# Patient Record
Sex: MALE | Race: WHITE | NOT HISPANIC OR LATINO | Employment: OTHER | URBAN - METROPOLITAN AREA
[De-identification: names, ages, dates, MRNs, and addresses within clinical notes are randomized per-mention and may not be internally consistent; named-entity substitution may affect disease eponyms.]

---

## 2023-10-05 ENCOUNTER — TELEPHONE (OUTPATIENT)
Dept: CARDIOLOGY | Facility: CLINIC | Age: 83
End: 2023-10-05
Payer: MEDICARE

## 2023-10-26 ENCOUNTER — HOSPITAL ENCOUNTER (OUTPATIENT)
Dept: CARDIOLOGY | Facility: HOSPITAL | Age: 83
Discharge: HOME | End: 2023-10-26
Payer: MEDICARE

## 2023-10-26 DIAGNOSIS — I35.0 NONRHEUMATIC AORTIC (VALVE) STENOSIS: ICD-10-CM

## 2023-10-26 LAB — EJECTION FRACTION APICAL 4 CHAMBER: 47.6

## 2023-10-26 PROCEDURE — 93306 TTE W/DOPPLER COMPLETE: CPT

## 2023-10-26 PROCEDURE — 93306 TTE W/DOPPLER COMPLETE: CPT | Performed by: INTERNAL MEDICINE

## 2023-11-03 NOTE — PROGRESS NOTES
"Counseling:  The patient was counseled regarding diagnostic results, instructions for management, risk factor reductions, prognosis, patient and family education, impressions, risks and benefits of treatment options and importance of compliance with treatment.      Chief Complaint:   The patient presents today for 10-month followup of CAD, HTN and aortic stenosis s/p echocardiogram.     History Of Present Illness:    Carlito Rivas is an 83 year old male patient who presents today in the company of his wife for 10-month followup of CAD, HTN and aortic stenosis s/p echocardiogram. His PMH is significant for aortic stenosis, CAD s/p PCI to RCA, hyperlipidemia, HTN, and JASWINDER. Echocardiogram performed 10/26/2023 demonstrated an EF of LVEF 50-55% and moderate aortic stenosis. Since last being seen, the patient states that he has done well from a cardiac standpoint. He denies any CP, chest discomfort or SOB. EKG today is stable with no acute changes. Per the patient's wife, he is being worked up for cataract surgery, and she inquired if this was safe to proceed with from a cardiac standpoint. The patient is compliant with his prescribed medications.      Last Recorded Vitals:  Vitals:    11/06/23 1118   BP: 102/64   Pulse: 74   Weight: 98.4 kg (217 lb)   Height: 1.778 m (5' 10\")       Past Surgical History:  He has a past surgical history that includes Adenoidectomy (10/12/2016); Tonsillectomy (10/12/2016); Total hip arthroplasty (05/23/2018); Other surgical history (05/23/2018); Coronary angioplasty (04/19/2021); and Cataract extraction (04/12/2017).      Social History:  He reports that he has never smoked. He has never used smokeless tobacco. He reports that he does not drink alcohol and does not use drugs.    Family History:  Family History   Problem Relation Name Age of Onset    Other (cardiovascular disease) Mother      Coronary artery disease Father      Heart attack Father          Allergies:  Patient has no known " allergies.    Outpatient Medications:  Current Outpatient Medications   Medication Instructions    acetaminophen (TYLENOL) 325 mg, oral, Every 6 hours PRN    alogliptin (Nesina) 12.5 mg tablet 1 tablet, oral, Daily    alogliptin (NESINA) 25 mg, oral, Daily RT    amLODIPine (Norvasc) 10 mg tablet 1 tablet, oral, Daily    aspirin 81 mg EC tablet 1 tablet, oral, Daily    blood sugar diagnostic (Accu-Chek Concetta Plus test strp) strip Test blood sugar two times a day, as instructed, on insulin, DM 2, Dx E11.65    cholecalciferol (Vitamin D-3) 50 MCG (2000 UT) tablet 1 tablet, oral, Daily    cholecalciferol (VITAMIN D-3) 3,000 Units, oral, Daily RT    clopidogrel (PLAVIX) 75 mg, oral, Daily    diclofenac sodium 2 g, Topical, 2 times daily    empagliflozin (Jardiance) 25 mg 0.5 tablets, oral, Daily    ibuprofen 84 mg, oral, Daily RT    insulin asp prt-insulin aspart (NovoLOG Mix 70-30 U-100 Insuln) 100 unit/mL (70-30) injection Inject 25 Units under the skin once daily (M-F).    insulin NPH, Isophane, (NovoLIN N NPH U-100 Insulin) 100 unit/mL injection INJECT 40 UNITS SUBCUTANEOUSLY IN THE MORNING AND INJECT 20 UNITS IN THE EVENING (DISCARD VIAL 28 DAYS AFTER FIRST USE)    lisinopril 40 mg tablet 1 tablet, oral, Daily    metoprolol tartrate (LOPRESSOR) 50 mg, oral, 2 times daily    quinapril (ACCUPRIL) 40 mg, oral, Daily RT    rosuvastatin (Crestor) 40 mg tablet 1 tablet, oral, Daily    SITagliptin phosphate (Januvia) 100 mg tablet 1 tablet, oral, Daily       Review of Systems   All other systems reviewed and are negative.     Physical Exam:  Constitutional:       Appearance: Healthy appearance. Not in distress.   Neck:      Vascular: No JVR. JVD normal.   Pulmonary:      Effort: Pulmonary effort is normal.      Breath sounds: Normal breath sounds. No wheezing. No rhonchi. No rales.   Chest:      Chest wall: Not tender to palpatation.   Cardiovascular:      PMI at left midclavicular line. Normal rate. Regular rhythm. Normal  S1. Normal S2.       Murmurs: There is no murmur.      No gallop.  No click. No rub.   Pulses:     Intact distal pulses.   Edema:     Peripheral edema absent.   Abdominal:      General: Bowel sounds are normal.      Palpations: Abdomen is soft.      Tenderness: There is no abdominal tenderness.   Musculoskeletal: Normal range of motion.         General: No tenderness. Skin:     General: Skin is warm and dry.   Neurological:      General: No focal deficit present.      Mental Status: Alert and oriented to person, place and time.        Last Labs:  CBC -  Lab Results   Component Value Date    WBC 5.6 12/19/2022    HGB 12.1 (L) 12/19/2022    HCT 38.0 (L) 12/19/2022    MCV 92 12/19/2022     (L) 12/19/2022       CMP -  Lab Results   Component Value Date    CALCIUM 8.4 (L) 12/19/2022    PROT 6.9 12/18/2022    ALBUMIN 4.1 12/18/2022    AST 17 12/18/2022    ALT 12 12/18/2022    ALKPHOS 71 12/18/2022    BILITOT 0.5 12/18/2022       LIPID PANEL -   Lab Results   Component Value Date    CHOL 121 10/28/2019    TRIG 115 10/28/2019    HDL 34.7 (A) 10/28/2019    CHHDL 3.5 10/28/2019    LDLF 63 10/28/2019    VLDL 23 10/28/2019       RENAL FUNCTION PANEL -   Lab Results   Component Value Date    GLUCOSE 115 (H) 12/19/2022     12/19/2022    K 4.3 12/19/2022     (H) 12/19/2022    CO2 22 12/19/2022    ANIONGAP 12 12/19/2022    BUN 19 12/19/2022    CREATININE 1.07 12/19/2022    GFRMALE 70 12/19/2022    CALCIUM 8.4 (L) 12/19/2022    ALBUMIN 4.1 12/18/2022        Lab Results   Component Value Date    HGBA1C 11.8 (H) 07/28/2022       Last Cardiology Tests:  10/26/2023 - TTE  1. Left ventricular systolic function is low normal with a 50-55% estimated ejection fraction.  2. Basal and mid inferior wall and basal and mid inferolateral wall are abnormal.  3. Spectral Doppler shows an impaired relaxation pattern of left ventricular diastolic filling.  4. Moderate aortic valve stenosis.    12/19/2022 - TTE  1. Left ventricular  systolic function is normal with a 60% estimated ejection fraction.  2. Spectral Doppler shows an impaired relaxation pattern of left ventricular diastolic filling.  3. Mild to moderate aortic valve stenosis.    05/06/2021 - TTE  1. The left ventricular systolic function is normal.  2. Mild aortic valve stenosis.     Diagnostic review: I have personally reviewed the result(s) of the Echocardiogram.    Assessment/Plan   1) CAD s/p PCI of RCA in past   Stable - Angina free  On metoprolol tartrate 50 mg BID, rosuvastatin 40 mg daily, ASA  TTE 10/26/2023 with LVEF 50-55%  Doing well - denies CP, chest discomfort or SOB  No contraindications to cataract surgery  Check CBC, CMP, Lipid Panel  Repeat echo 1 year      2) Mild AS by Echo  TTE 10/26/2023 with moderate aortic stenosis  Denies SOB  Repeat echo 1 year     3) HTN  Stable  On amlodipine 10 mg daily, metoprolol tartrate 50 mg BID, lisinopril 40 mg daily   Check CMP         Scribe Attestation  By signing my name below, I, Kristi Garrison   attest that this documentation has been prepared under the direction and in the presence of Efren Pandey MD.

## 2023-11-04 PROBLEM — R06.09 DOE (DYSPNEA ON EXERTION): Status: ACTIVE | Noted: 2023-11-04

## 2023-11-04 PROBLEM — E78.5 HYPERLIPIDEMIA: Status: ACTIVE | Noted: 2023-11-04

## 2023-11-04 PROBLEM — E11.21: Status: ACTIVE | Noted: 2019-01-17

## 2023-11-04 PROBLEM — R97.20 ELEVATED PSA: Status: ACTIVE | Noted: 2023-11-04

## 2023-11-04 PROBLEM — B35.1 ONYCHOMYCOSIS: Status: ACTIVE | Noted: 2023-11-04

## 2023-11-04 PROBLEM — F03.90 DEMENTIA (MULTI): Status: ACTIVE | Noted: 2022-07-28

## 2023-11-04 PROBLEM — I25.9 MYOCARDIAL ISCHEMIA: Status: ACTIVE | Noted: 2023-11-04

## 2023-11-04 PROBLEM — E11.40 TYPE 2 DIABETES MELLITUS WITH DIABETIC NEUROPATHY, UNSPECIFIED (MULTI): Status: ACTIVE | Noted: 2023-11-04

## 2023-11-04 PROBLEM — R41.9 NEUROCOGNITIVE DISORDER: Status: ACTIVE | Noted: 2023-11-04

## 2023-11-04 PROBLEM — G47.33 OBSTRUCTIVE SLEEP APNEA, ADULT: Status: ACTIVE | Noted: 2023-11-04

## 2023-11-04 PROBLEM — M19.90 OSTEOARTHRITIS: Status: ACTIVE | Noted: 2023-11-04

## 2023-11-04 PROBLEM — I25.10 ARTERIOSCLEROSIS OF CORONARY ARTERY: Status: ACTIVE | Noted: 2023-11-04

## 2023-11-04 PROBLEM — I10 HYPERTENSION: Status: ACTIVE | Noted: 2023-11-04

## 2023-11-04 PROBLEM — R25.1 TREMOR: Status: ACTIVE | Noted: 2023-11-04

## 2023-11-04 PROBLEM — I87.2 VENOUS INSUFFICIENCY OF LEG: Status: ACTIVE | Noted: 2023-11-04

## 2023-11-04 PROBLEM — H26.9 BILATERAL CATARACTS: Status: ACTIVE | Noted: 2023-11-04

## 2023-11-04 PROBLEM — I35.0 AORTIC STENOSIS: Status: ACTIVE | Noted: 2022-07-28

## 2023-11-04 PROBLEM — E55.9 VITAMIN D DEFICIENCY: Status: ACTIVE | Noted: 2019-07-30

## 2023-11-04 PROBLEM — E66.9 OBESITY, CLASS I, BMI 30-34.9: Status: ACTIVE | Noted: 2019-01-17

## 2023-11-04 PROBLEM — L97.909 VENOUS STASIS ULCER OF LOWER LEG (MULTI): Status: ACTIVE | Noted: 2023-11-04

## 2023-11-04 PROBLEM — I83.008 VENOUS STASIS ULCER OF LOWER LEG (MULTI): Status: ACTIVE | Noted: 2023-11-04

## 2023-11-04 RX ORDER — METOPROLOL TARTRATE 50 MG/1
50 TABLET ORAL 2 TIMES DAILY
COMMUNITY

## 2023-11-04 RX ORDER — ACETAMINOPHEN 325 MG/1
325 TABLET ORAL EVERY 6 HOURS PRN
COMMUNITY
Start: 2022-07-28

## 2023-11-04 RX ORDER — BLOOD SUGAR DIAGNOSTIC
STRIP MISCELLANEOUS
COMMUNITY
Start: 2021-01-26

## 2023-11-04 RX ORDER — ALOGLIPTIN 12.5 MG/1
1 TABLET, FILM COATED ORAL DAILY
COMMUNITY
End: 2023-11-06 | Stop reason: WASHOUT

## 2023-11-04 RX ORDER — QUINAPRIL 40 MG/1
40 TABLET ORAL
COMMUNITY
Start: 2022-07-28 | End: 2023-11-06 | Stop reason: WASHOUT

## 2023-11-04 RX ORDER — LISINOPRIL 40 MG/1
1 TABLET ORAL DAILY
COMMUNITY
Start: 2023-03-20

## 2023-11-04 RX ORDER — ROSUVASTATIN CALCIUM 40 MG/1
1 TABLET, COATED ORAL DAILY
COMMUNITY
Start: 2023-03-20

## 2023-11-04 RX ORDER — HUMAN INSULIN 100 [IU]/ML
INJECTION, SUSPENSION SUBCUTANEOUS
COMMUNITY
Start: 2023-02-15 | End: 2023-11-06 | Stop reason: WASHOUT

## 2023-11-04 RX ORDER — CLOPIDOGREL BISULFATE 75 MG/1
75 TABLET ORAL DAILY
COMMUNITY
End: 2023-11-06 | Stop reason: WASHOUT

## 2023-11-04 RX ORDER — AMLODIPINE BESYLATE 10 MG/1
1 TABLET ORAL DAILY
COMMUNITY
Start: 2015-04-15

## 2023-11-04 RX ORDER — CHOLECALCIFEROL (VITAMIN D3) 25 MCG
3000 TABLET ORAL
COMMUNITY
Start: 2016-10-12

## 2023-11-04 RX ORDER — IBUPROFEN 200 MG
84 TABLET ORAL
COMMUNITY

## 2023-11-04 RX ORDER — ASPIRIN 81 MG/1
1 TABLET ORAL DAILY
COMMUNITY
Start: 2016-10-12

## 2023-11-04 RX ORDER — INSULIN ASPART 100 [IU]/ML
25 INJECTION, SUSPENSION SUBCUTANEOUS
COMMUNITY

## 2023-11-04 RX ORDER — ALOGLIPTIN 25 MG/1
25 TABLET, FILM COATED ORAL
COMMUNITY
Start: 2022-07-28 | End: 2023-11-06 | Stop reason: WASHOUT

## 2023-11-04 RX ORDER — CHOLECALCIFEROL (VITAMIN D3) 50 MCG
1 TABLET ORAL DAILY
COMMUNITY
Start: 2023-03-20 | End: 2023-11-06 | Stop reason: WASHOUT

## 2023-11-06 ENCOUNTER — OFFICE VISIT (OUTPATIENT)
Dept: CARDIOLOGY | Facility: CLINIC | Age: 83
End: 2023-11-06
Payer: MEDICARE

## 2023-11-06 VITALS
HEART RATE: 74 BPM | HEIGHT: 70 IN | DIASTOLIC BLOOD PRESSURE: 64 MMHG | SYSTOLIC BLOOD PRESSURE: 102 MMHG | WEIGHT: 217 LBS | BODY MASS INDEX: 31.07 KG/M2

## 2023-11-06 DIAGNOSIS — I35.0 NONRHEUMATIC AORTIC VALVE STENOSIS: Primary | ICD-10-CM

## 2023-11-06 DIAGNOSIS — I25.10 ARTERIOSCLEROSIS OF CORONARY ARTERY: ICD-10-CM

## 2023-11-06 DIAGNOSIS — I10 PRIMARY HYPERTENSION: ICD-10-CM

## 2023-11-06 PROCEDURE — 1160F RVW MEDS BY RX/DR IN RCRD: CPT | Performed by: INTERNAL MEDICINE

## 2023-11-06 PROCEDURE — 3074F SYST BP LT 130 MM HG: CPT | Performed by: INTERNAL MEDICINE

## 2023-11-06 PROCEDURE — 3078F DIAST BP <80 MM HG: CPT | Performed by: INTERNAL MEDICINE

## 2023-11-06 PROCEDURE — 99213 OFFICE O/P EST LOW 20 MIN: CPT | Performed by: INTERNAL MEDICINE

## 2023-11-06 PROCEDURE — 1159F MED LIST DOCD IN RCRD: CPT | Performed by: INTERNAL MEDICINE

## 2023-11-06 PROCEDURE — 93000 ELECTROCARDIOGRAM COMPLETE: CPT | Performed by: INTERNAL MEDICINE

## 2023-11-06 PROCEDURE — 1036F TOBACCO NON-USER: CPT | Performed by: INTERNAL MEDICINE

## 2023-11-06 ASSESSMENT — ENCOUNTER SYMPTOMS
LOSS OF SENSATION IN FEET: 0
OCCASIONAL FEELINGS OF UNSTEADINESS: 1
DEPRESSION: 0

## 2023-11-06 NOTE — PATIENT INSTRUCTIONS
Continue all current medications as prescribed.   Please have blood work drawn at your earliest convenience.   Repeat echocardiogram (ultrasound of the heart) in 1 year to followup on your heart function and structure.   No contraindications to having cataract surgery.   Followup with Dr. Pandey in 1 year, sooner should any issues or concerns arise before then.     If you have any questions or cardiac concerns, please call our office at [phone].

## 2023-11-06 NOTE — LETTER
"November 6, 2023     Carlito Whitmore MD  [address]  [facility] [address] [address]    Patient: Carlito Rivas   YOB: 1940   Date of Visit: 11/6/2023       Dear Dr. Carlito Whitmore MD:    Thank you for referring Carlito Rivas to me for evaluation. Below are my notes for this consultation.  If you have questions, please do not hesitate to call me. I look forward to following your patient along with you.       Sincerely,     Efren Pandey MD      CC: No Recipients  ______________________________________________________________________________________    Counseling:  The patient was counseled regarding diagnostic results, instructions for management, risk factor reductions, prognosis, patient and family education, impressions, risks and benefits of treatment options and importance of compliance with treatment.      Chief Complaint:   The patient presents today for 10-month followup of CAD, HTN and aortic stenosis s/p echocardiogram.     History Of Present Illness:    Carlito Rivas is an 83 year old male patient who presents today in the company of his wife for 10-month followup of CAD, HTN and aortic stenosis s/p echocardiogram. His PMH is significant for aortic stenosis, CAD s/p PCI to RCA, hyperlipidemia, HTN, and JASWINDER. Echocardiogram performed 10/26/2023 demonstrated an EF of LVEF 50-55% and moderate aortic stenosis. Since last being seen, the patient states that he has done well from a cardiac standpoint. He denies any CP, chest discomfort or SOB. EKG today is stable with no acute changes. Per the patient's wife, he is being worked up for cataract surgery, and she inquired if this was safe to proceed with from a cardiac standpoint. The patient is compliant with his prescribed medications.      Last Recorded Vitals:  Vitals:    11/06/23 1118   BP: 102/64   Pulse: 74   Weight: 98.4 kg (217 lb)   Height: 1.778 m (5' 10\")       Past Surgical History:  He " has a past surgical history that includes Adenoidectomy (10/12/2016); Tonsillectomy (10/12/2016); Total hip arthroplasty (05/23/2018); Other surgical history (05/23/2018); Coronary angioplasty (04/19/2021); and Cataract extraction (04/12/2017).      Social History:  He reports that he has never smoked. He has never used smokeless tobacco. He reports that he does not drink alcohol and does not use drugs.    Family History:  Family History   Problem Relation Name Age of Onset   • Other (cardiovascular disease) Mother     • Coronary artery disease Father     • Heart attack Father          Allergies:  Patient has no known allergies.    Outpatient Medications:  Current Outpatient Medications   Medication Instructions   • acetaminophen (TYLENOL) 325 mg, oral, Every 6 hours PRN   • alogliptin (Nesina) 12.5 mg tablet 1 tablet, oral, Daily   • alogliptin (NESINA) 25 mg, oral, Daily RT   • amLODIPine (Norvasc) 10 mg tablet 1 tablet, oral, Daily   • aspirin 81 mg EC tablet 1 tablet, oral, Daily   • blood sugar diagnostic (Accu-Chek Concetta Plus test strp) strip Test blood sugar two times a day, as instructed, on insulin, DM 2, Dx E11.65   • cholecalciferol (Vitamin D-3) 50 MCG (2000 UT) tablet 1 tablet, oral, Daily   • cholecalciferol (VITAMIN D-3) 3,000 Units, oral, Daily RT   • clopidogrel (PLAVIX) 75 mg, oral, Daily   • diclofenac sodium 2 g, Topical, 2 times daily   • empagliflozin (Jardiance) 25 mg 0.5 tablets, oral, Daily   • ibuprofen 84 mg, oral, Daily RT   • insulin asp prt-insulin aspart (NovoLOG Mix 70-30 U-100 Insuln) 100 unit/mL (70-30) injection Inject 25 Units under the skin once daily (M-F).   • insulin NPH, Isophane, (NovoLIN N NPH U-100 Insulin) 100 unit/mL injection INJECT 40 UNITS SUBCUTANEOUSLY IN THE MORNING AND INJECT 20 UNITS IN THE EVENING (DISCARD VIAL 28 DAYS AFTER FIRST USE)   • lisinopril 40 mg tablet 1 tablet, oral, Daily   • metoprolol tartrate (LOPRESSOR) 50 mg, oral, 2 times daily   • quinapril  (ACCUPRIL) 40 mg, oral, Daily RT   • rosuvastatin (Crestor) 40 mg tablet 1 tablet, oral, Daily   • SITagliptin phosphate (Januvia) 100 mg tablet 1 tablet, oral, Daily       Review of Systems   All other systems reviewed and are negative.     Physical Exam:  Constitutional:       Appearance: Healthy appearance. Not in distress.   Neck:      Vascular: No JVR. JVD normal.   Pulmonary:      Effort: Pulmonary effort is normal.      Breath sounds: Normal breath sounds. No wheezing. No rhonchi. No rales.   Chest:      Chest wall: Not tender to palpatation.   Cardiovascular:      PMI at left midclavicular line. Normal rate. Regular rhythm. Normal S1. Normal S2.       Murmurs: There is no murmur.      No gallop.  No click. No rub.   Pulses:     Intact distal pulses.   Edema:     Peripheral edema absent.   Abdominal:      General: Bowel sounds are normal.      Palpations: Abdomen is soft.      Tenderness: There is no abdominal tenderness.   Musculoskeletal: Normal range of motion.         General: No tenderness. Skin:     General: Skin is warm and dry.   Neurological:      General: No focal deficit present.      Mental Status: Alert and oriented to person, place and time.        Last Labs:  CBC -  Lab Results   Component Value Date    WBC 5.6 12/19/2022    HGB 12.1 (L) 12/19/2022    HCT 38.0 (L) 12/19/2022    MCV 92 12/19/2022     (L) 12/19/2022       CMP -  Lab Results   Component Value Date    CALCIUM 8.4 (L) 12/19/2022    PROT 6.9 12/18/2022    ALBUMIN 4.1 12/18/2022    AST 17 12/18/2022    ALT 12 12/18/2022    ALKPHOS 71 12/18/2022    BILITOT 0.5 12/18/2022       LIPID PANEL -   Lab Results   Component Value Date    CHOL 121 10/28/2019    TRIG 115 10/28/2019    HDL 34.7 (A) 10/28/2019    CHHDL 3.5 10/28/2019    LDLF 63 10/28/2019    VLDL 23 10/28/2019       RENAL FUNCTION PANEL -   Lab Results   Component Value Date    GLUCOSE 115 (H) 12/19/2022     12/19/2022    K 4.3 12/19/2022     (H) 12/19/2022     CO2 22 12/19/2022    ANIONGAP 12 12/19/2022    BUN 19 12/19/2022    CREATININE 1.07 12/19/2022    GFRMALE 70 12/19/2022    CALCIUM 8.4 (L) 12/19/2022    ALBUMIN 4.1 12/18/2022        Lab Results   Component Value Date    HGBA1C 11.8 (H) 07/28/2022       Last Cardiology Tests:  10/26/2023 - TTE  1. Left ventricular systolic function is low normal with a 50-55% estimated ejection fraction.  2. Basal and mid inferior wall and basal and mid inferolateral wall are abnormal.  3. Spectral Doppler shows an impaired relaxation pattern of left ventricular diastolic filling.  4. Moderate aortic valve stenosis.    12/19/2022 - TTE  1. Left ventricular systolic function is normal with a 60% estimated ejection fraction.  2. Spectral Doppler shows an impaired relaxation pattern of left ventricular diastolic filling.  3. Mild to moderate aortic valve stenosis.    05/06/2021 - TTE  1. The left ventricular systolic function is normal.  2. Mild aortic valve stenosis.     Diagnostic review: I have personally reviewed the result(s) of the Echocardiogram.    Assessment/Plan  1) CAD s/p PCI of RCA in past   Stable - Angina free  On metoprolol tartrate 50 mg BID, rosuvastatin 40 mg daily, ASA  TTE 10/26/2023 with LVEF 50-55%  Doing well - denies CP, chest discomfort or SOB  No contraindications to cataract surgery  Check CBC, CMP, Lipid Panel  Repeat echo 1 year      2) Mild AS by Echo  TTE 10/26/2023 with moderate aortic stenosis  Denies SOB  Repeat echo 1 year     3) HTN  Stable  On amlodipine 10 mg daily, metoprolol tartrate 50 mg BID, lisinopril 40 mg daily   Check CMP         Scribe Attestation  By signing my name below, I, Kristi Garrison   attest that this documentation has been prepared under the direction and in the presence of Efren Pandey MD.

## 2024-04-15 ENCOUNTER — APPOINTMENT (OUTPATIENT)
Dept: RADIOLOGY | Facility: HOSPITAL | Age: 84
End: 2024-04-15
Payer: OTHER GOVERNMENT

## 2024-04-15 ENCOUNTER — HOSPITAL ENCOUNTER (EMERGENCY)
Facility: HOSPITAL | Age: 84
Discharge: OTHER INSTITUTION | End: 2024-04-15
Attending: STUDENT IN AN ORGANIZED HEALTH CARE EDUCATION/TRAINING PROGRAM
Payer: OTHER GOVERNMENT

## 2024-04-15 ENCOUNTER — APPOINTMENT (OUTPATIENT)
Dept: CARDIOLOGY | Facility: HOSPITAL | Age: 84
End: 2024-04-15
Payer: OTHER GOVERNMENT

## 2024-04-15 VITALS
DIASTOLIC BLOOD PRESSURE: 85 MMHG | RESPIRATION RATE: 18 BRPM | HEART RATE: 83 BPM | HEIGHT: 70 IN | SYSTOLIC BLOOD PRESSURE: 178 MMHG | OXYGEN SATURATION: 92 % | BODY MASS INDEX: 27.55 KG/M2 | TEMPERATURE: 97.3 F | WEIGHT: 192.46 LBS

## 2024-04-15 DIAGNOSIS — I48.91 ATRIAL FIBRILLATION WITH RAPID VENTRICULAR RESPONSE (MULTI): ICD-10-CM

## 2024-04-15 DIAGNOSIS — N10 ACUTE PYELONEPHRITIS: Primary | ICD-10-CM

## 2024-04-15 DIAGNOSIS — N20.0 KIDNEY STONE: ICD-10-CM

## 2024-04-15 DIAGNOSIS — Z96.0 RETAINED URETERAL STENT: ICD-10-CM

## 2024-04-15 DIAGNOSIS — N17.9 ACUTE KIDNEY INJURY (CMS-HCC): ICD-10-CM

## 2024-04-15 DIAGNOSIS — E86.0 DEHYDRATION: ICD-10-CM

## 2024-04-15 LAB
ABO GROUP (TYPE) IN BLOOD: NORMAL
ALBUMIN SERPL BCP-MCNC: 3.2 G/DL (ref 3.4–5)
ALP SERPL-CCNC: 69 U/L (ref 33–136)
ALT SERPL W P-5'-P-CCNC: 20 U/L (ref 10–52)
ANION GAP SERPL CALC-SCNC: 12 MMOL/L (ref 10–20)
ANTIBODY SCREEN: NORMAL
APPEARANCE UR: ABNORMAL
AST SERPL W P-5'-P-CCNC: 22 U/L (ref 9–39)
BACTERIA #/AREA URNS AUTO: ABNORMAL /HPF
BASOPHILS # BLD AUTO: 0.04 X10*3/UL (ref 0–0.1)
BASOPHILS NFR BLD AUTO: 0.3 %
BILIRUB SERPL-MCNC: 0.5 MG/DL (ref 0–1.2)
BILIRUB UR STRIP.AUTO-MCNC: NEGATIVE MG/DL
BNP SERPL-MCNC: 120 PG/ML (ref 0–99)
BUN SERPL-MCNC: 36 MG/DL (ref 6–23)
CALCIUM SERPL-MCNC: 9 MG/DL (ref 8.6–10.3)
CARDIAC TROPONIN I PNL SERPL HS: 23 NG/L (ref 0–20)
CARDIAC TROPONIN I PNL SERPL HS: 26 NG/L (ref 0–20)
CHLORIDE SERPL-SCNC: 113 MMOL/L (ref 98–107)
CO2 SERPL-SCNC: 22 MMOL/L (ref 21–32)
COLOR UR: ABNORMAL
CREAT SERPL-MCNC: 1.97 MG/DL (ref 0.5–1.3)
EGFRCR SERPLBLD CKD-EPI 2021: 33 ML/MIN/1.73M*2
EOSINOPHIL # BLD AUTO: 0.13 X10*3/UL (ref 0–0.4)
EOSINOPHIL NFR BLD AUTO: 0.9 %
ERYTHROCYTE [DISTWIDTH] IN BLOOD BY AUTOMATED COUNT: 14.5 % (ref 11.5–14.5)
GLUCOSE SERPL-MCNC: 88 MG/DL (ref 74–99)
GLUCOSE UR STRIP.AUTO-MCNC: ABNORMAL MG/DL
HCT VFR BLD AUTO: 43.6 % (ref 41–52)
HGB BLD-MCNC: 14.2 G/DL (ref 13.5–17.5)
HOLD SPECIMEN: NORMAL
IMM GRANULOCYTES # BLD AUTO: 0.05 X10*3/UL (ref 0–0.5)
IMM GRANULOCYTES NFR BLD AUTO: 0.4 % (ref 0–0.9)
INR PPP: 1.5 (ref 0.9–1.1)
KETONES UR STRIP.AUTO-MCNC: NEGATIVE MG/DL
LACTATE SERPL-SCNC: 1.8 MMOL/L (ref 0.4–2)
LACTATE SERPL-SCNC: 2.1 MMOL/L (ref 0.4–2)
LEUKOCYTE ESTERASE UR QL STRIP.AUTO: ABNORMAL
LYMPHOCYTES # BLD AUTO: 1.94 X10*3/UL (ref 0.8–3)
LYMPHOCYTES NFR BLD AUTO: 13.7 %
MAGNESIUM SERPL-MCNC: 2.29 MG/DL (ref 1.6–2.4)
MCH RBC QN AUTO: 29 PG (ref 26–34)
MCHC RBC AUTO-ENTMCNC: 32.6 G/DL (ref 32–36)
MCV RBC AUTO: 89 FL (ref 80–100)
MONOCYTES # BLD AUTO: 0.98 X10*3/UL (ref 0.05–0.8)
MONOCYTES NFR BLD AUTO: 6.9 %
NEUTROPHILS # BLD AUTO: 10.99 X10*3/UL (ref 1.6–5.5)
NEUTROPHILS NFR BLD AUTO: 77.8 %
NITRITE UR QL STRIP.AUTO: NEGATIVE
NRBC BLD-RTO: 0 /100 WBCS (ref 0–0)
PH UR STRIP.AUTO: 5 [PH]
PLATELET # BLD AUTO: 196 X10*3/UL (ref 150–450)
POTASSIUM SERPL-SCNC: 4 MMOL/L (ref 3.5–5.3)
PROT SERPL-MCNC: 6.1 G/DL (ref 6.4–8.2)
PROT UR STRIP.AUTO-MCNC: ABNORMAL MG/DL
PROTHROMBIN TIME: 16.6 SECONDS (ref 9.8–12.8)
RBC # BLD AUTO: 4.89 X10*6/UL (ref 4.5–5.9)
RBC # UR STRIP.AUTO: ABNORMAL /UL
RBC #/AREA URNS AUTO: >20 /HPF
RH FACTOR (ANTIGEN D): NORMAL
SODIUM SERPL-SCNC: 143 MMOL/L (ref 136–145)
SP GR UR STRIP.AUTO: 1.01
TSH SERPL-ACNC: 2.4 MIU/L (ref 0.44–3.98)
UROBILINOGEN UR STRIP.AUTO-MCNC: <2 MG/DL
WBC # BLD AUTO: 14.1 X10*3/UL (ref 4.4–11.3)
WBC #/AREA URNS AUTO: >50 /HPF
WBC CLUMPS #/AREA URNS AUTO: ABNORMAL /HPF
YEAST BUDDING #/AREA UR COMP ASSIST: PRESENT /HPF

## 2024-04-15 PROCEDURE — 71046 X-RAY EXAM CHEST 2 VIEWS: CPT | Performed by: RADIOLOGY

## 2024-04-15 PROCEDURE — 74176 CT ABD & PELVIS W/O CONTRAST: CPT | Performed by: RADIOLOGY

## 2024-04-15 PROCEDURE — 74176 CT ABD & PELVIS W/O CONTRAST: CPT

## 2024-04-15 PROCEDURE — 80053 COMPREHEN METABOLIC PANEL: CPT | Performed by: STUDENT IN AN ORGANIZED HEALTH CARE EDUCATION/TRAINING PROGRAM

## 2024-04-15 PROCEDURE — 36415 COLL VENOUS BLD VENIPUNCTURE: CPT | Performed by: STUDENT IN AN ORGANIZED HEALTH CARE EDUCATION/TRAINING PROGRAM

## 2024-04-15 PROCEDURE — 71046 X-RAY EXAM CHEST 2 VIEWS: CPT

## 2024-04-15 PROCEDURE — 87086 URINE CULTURE/COLONY COUNT: CPT | Mod: PORLAB | Performed by: STUDENT IN AN ORGANIZED HEALTH CARE EDUCATION/TRAINING PROGRAM

## 2024-04-15 PROCEDURE — 81001 URINALYSIS AUTO W/SCOPE: CPT | Performed by: STUDENT IN AN ORGANIZED HEALTH CARE EDUCATION/TRAINING PROGRAM

## 2024-04-15 PROCEDURE — 85610 PROTHROMBIN TIME: CPT | Performed by: STUDENT IN AN ORGANIZED HEALTH CARE EDUCATION/TRAINING PROGRAM

## 2024-04-15 PROCEDURE — 84484 ASSAY OF TROPONIN QUANT: CPT | Performed by: STUDENT IN AN ORGANIZED HEALTH CARE EDUCATION/TRAINING PROGRAM

## 2024-04-15 PROCEDURE — 99291 CRITICAL CARE FIRST HOUR: CPT | Performed by: STUDENT IN AN ORGANIZED HEALTH CARE EDUCATION/TRAINING PROGRAM

## 2024-04-15 PROCEDURE — 93005 ELECTROCARDIOGRAM TRACING: CPT

## 2024-04-15 PROCEDURE — 85025 COMPLETE CBC W/AUTO DIFF WBC: CPT | Performed by: STUDENT IN AN ORGANIZED HEALTH CARE EDUCATION/TRAINING PROGRAM

## 2024-04-15 PROCEDURE — 83735 ASSAY OF MAGNESIUM: CPT | Performed by: STUDENT IN AN ORGANIZED HEALTH CARE EDUCATION/TRAINING PROGRAM

## 2024-04-15 PROCEDURE — 84443 ASSAY THYROID STIM HORMONE: CPT | Performed by: STUDENT IN AN ORGANIZED HEALTH CARE EDUCATION/TRAINING PROGRAM

## 2024-04-15 PROCEDURE — 2500000004 HC RX 250 GENERAL PHARMACY W/ HCPCS (ALT 636 FOR OP/ED): Performed by: STUDENT IN AN ORGANIZED HEALTH CARE EDUCATION/TRAINING PROGRAM

## 2024-04-15 PROCEDURE — 96365 THER/PROPH/DIAG IV INF INIT: CPT

## 2024-04-15 PROCEDURE — 87040 BLOOD CULTURE FOR BACTERIA: CPT | Mod: PORLAB | Performed by: STUDENT IN AN ORGANIZED HEALTH CARE EDUCATION/TRAINING PROGRAM

## 2024-04-15 PROCEDURE — 99285 EMERGENCY DEPT VISIT HI MDM: CPT | Mod: 25

## 2024-04-15 PROCEDURE — 83880 ASSAY OF NATRIURETIC PEPTIDE: CPT | Performed by: STUDENT IN AN ORGANIZED HEALTH CARE EDUCATION/TRAINING PROGRAM

## 2024-04-15 PROCEDURE — 83605 ASSAY OF LACTIC ACID: CPT | Performed by: STUDENT IN AN ORGANIZED HEALTH CARE EDUCATION/TRAINING PROGRAM

## 2024-04-15 PROCEDURE — 86901 BLOOD TYPING SEROLOGIC RH(D): CPT | Performed by: STUDENT IN AN ORGANIZED HEALTH CARE EDUCATION/TRAINING PROGRAM

## 2024-04-15 RX ORDER — CEFTRIAXONE 1 G/50ML
1 INJECTION, SOLUTION INTRAVENOUS ONCE
Status: COMPLETED | OUTPATIENT
Start: 2024-04-15 | End: 2024-04-15

## 2024-04-15 RX ORDER — INSULIN LISPRO 100 [IU]/ML
0-5 INJECTION, SOLUTION INTRAVENOUS; SUBCUTANEOUS
Status: DISCONTINUED | OUTPATIENT
Start: 2024-04-16 | End: 2024-04-16 | Stop reason: HOSPADM

## 2024-04-15 RX ORDER — DEXTROSE 50 % IN WATER (D50W) INTRAVENOUS SYRINGE
12.5
Status: DISCONTINUED | OUTPATIENT
Start: 2024-04-15 | End: 2024-04-16 | Stop reason: HOSPADM

## 2024-04-15 RX ORDER — DEXTROSE 50 % IN WATER (D50W) INTRAVENOUS SYRINGE
25
Status: DISCONTINUED | OUTPATIENT
Start: 2024-04-15 | End: 2024-04-16 | Stop reason: HOSPADM

## 2024-04-15 RX ADMIN — CEFTRIAXONE SODIUM 1 G: 1 INJECTION, SOLUTION INTRAVENOUS at 12:00

## 2024-04-15 RX ADMIN — SODIUM CHLORIDE 1000 ML: 9 INJECTION, SOLUTION INTRAVENOUS at 09:35

## 2024-04-15 ASSESSMENT — LIFESTYLE VARIABLES
EVER HAD A DRINK FIRST THING IN THE MORNING TO STEADY YOUR NERVES TO GET RID OF A HANGOVER: NO
HAVE YOU EVER FELT YOU SHOULD CUT DOWN ON YOUR DRINKING: NO
EVER FELT BAD OR GUILTY ABOUT YOUR DRINKING: NO
HAVE PEOPLE ANNOYED YOU BY CRITICIZING YOUR DRINKING: NO
TOTAL SCORE: 0

## 2024-04-15 ASSESSMENT — PAIN SCALES - GENERAL: PAINLEVEL_OUTOF10: 0 - NO PAIN

## 2024-04-15 ASSESSMENT — PAIN - FUNCTIONAL ASSESSMENT: PAIN_FUNCTIONAL_ASSESSMENT: 0-10

## 2024-04-15 NOTE — ED PROVIDER NOTES
HPI   Chief Complaint   Patient presents with    Hypotension       HPI: Patient is an 83-year-old male, he has a past medical history of coronary artery disease, hypertension, diabetes, hyperlipidemia, venous insufficiency, chronic kidney disease stage III, he was found to have a UTI with kidney stone and was stented, [facility] for [facility] and tachycardia.  Reports a decrease in appetite over the past week, no increased output, no diarrhea, no vomiting, just reports no taste and nausea when he eats.  He is having a little bit of right flank pain, that is where the stent was placed, he did fall a few days ago and hit the right side, did not hit his head or lose consciousness, he is denying any dysuria or polyuria, no abdominal pain, no chest pain associated with symptoms.      ROS: Complete 12 point review of systems performed, otherwise negative except as noted in the history of present illness    PMH: Reviewed, documented below in note. Pertinents in HPI  PSH: Reviewed and documented below in note. Pertinents in HPI  SH: Lives at home with his wife, no illicits.  Fam: Reviewed, noncontributory to patients current complaint  MEDS: Reviewed and documented below in note. Pertinents in HPI  ALLERGIES: Reviewed and documented below in note.        History provided by:  Patient and medical records   used: No                          [place] Coma Scale Score: 14                  Patient History   Past Medical History:   Diagnosis Date    Abnormal electrocardiogram (ECG) (EKG)     Abnormal EKG    Bilateral primary osteoarthritis of knee 05/23/2018    Arthritis of both knees    Old myocardial infarction 05/23/2018    History of myocardial infarction    Personal history of transient ischemic attack (TIA), and cerebral infarction without residual deficits     History of transient cerebral ischemia     Past Surgical History:  "  Procedure Laterality Date    ADENOIDECTOMY  10/12/2016    Adenoidectomy    CATARACT EXTRACTION  04/12/2017    Cataract Surgery    CORONARY ANGIOPLASTY  04/19/2021    PTCA    OTHER SURGICAL HISTORY  05/23/2018    Hip Replacement Left    TONSILLECTOMY  10/12/2016    Tonsillectomy    TOTAL HIP ARTHROPLASTY  05/23/2018    Hip Replacement     Family History   Problem Relation Name Age of Onset    Other (cardiovascular disease) Mother      Coronary artery disease Father      Heart attack Father       Social History     Tobacco Use    Smoking status: Never    Smokeless tobacco: Never   Substance Use Topics    Alcohol use: Never    Drug use: Never       Physical Exam   Visit Vitals  BP (!) 141/95   Pulse 86   Temp 36.3 °C (97.3 °F) (Temporal)   Resp 16   Ht 1.778 m (5' 10\")   Wt 87.3 kg (192 lb 7.4 oz)   SpO2 99%   BMI 27.62 kg/m²   Smoking Status Never   BSA 2.08 m²      Physical Exam  Vitals and nursing note reviewed.   Constitutional:       Appearance: Normal appearance.   HENT:      Head: Normocephalic and atraumatic.      Mouth/Throat:      Mouth: Mucous membranes are dry.      Pharynx: Oropharynx is clear.   Eyes:      Extraocular Movements: Extraocular movements intact.      Pupils: Pupils are equal, round, and reactive to light.   Neck:      Vascular: No carotid bruit.   Cardiovascular:      Rate and Rhythm: Tachycardia present. Rhythm irregular.      Heart sounds: Normal heart sounds.   Pulmonary:      Effort: Pulmonary effort is normal.      Breath sounds: Normal breath sounds. No wheezing or rhonchi.   Abdominal:      General: There is no distension.      Palpations: Abdomen is soft.      Tenderness: There is no abdominal tenderness. There is right CVA tenderness. There is no left CVA tenderness, guarding or rebound.   Musculoskeletal:         General: No tenderness, deformity or signs of injury.      Cervical back: Normal range of motion. No rigidity.   Skin:     General: Skin is warm and dry.      Capillary " Refill: Capillary refill takes 2 to 3 seconds.   Neurological:      General: No focal deficit present.      Mental Status: He is alert and oriented to person, place, and time.      Sensory: No sensory deficit.      Motor: No weakness.   Psychiatric:         Mood and Affect: Mood normal.         Behavior: Behavior normal.         CT abdomen pelvis wo IV contrast   Final Result   Mild right hydronephrosis despite ureteral stent in place. Proximal   margin of the stent is coiled in the proximal ureter.   12 mm calculus at the right ureteropelvic junction just proximal to   the stent. Small amount of air in the right collecting system and   urinary bladder likely iatrogenic.   18 mm intrarenal calculus on the right also noted.        MACRO:   None.        Signed by: Karlo Acuna 4/15/2024 11:15 AM   Dictation workstation:   [id number]      XR chest 2 views   Final Result   1.  No evidence of acute cardiopulmonary process.             Signed by: Giuliano Banda 4/15/2024 10:21 AM   Dictation workstation:   [id number]          Labs Reviewed   CBC WITH AUTO DIFFERENTIAL - Abnormal       Result Value    WBC 14.1 (*)     nRBC 0.0      RBC 4.89      Hemoglobin 14.2      Hematocrit 43.6      MCV 89      MCH 29.0      MCHC 32.6      RDW 14.5      Platelets 196      Neutrophils % 77.8      Immature Granulocytes %, Automated 0.4      Lymphocytes % 13.7      Monocytes % 6.9      Eosinophils % 0.9      Basophils % 0.3      Neutrophils Absolute 10.99 (*)     Immature Granulocytes Absolute, Automated 0.05      Lymphocytes Absolute 1.94      Monocytes Absolute 0.98 (*)     Eosinophils Absolute 0.13      Basophils Absolute 0.04     COMPREHENSIVE METABOLIC PANEL - Abnormal    Glucose 88      Sodium 143      Potassium 4.0      Chloride 113 (*)     Bicarbonate 22      Anion Gap 12      Urea Nitrogen 36 (*)     Creatinine 1.97 (*)     eGFR 33 (*)     Calcium 9.0      Albumin 3.2 (*)     Alkaline Phosphatase 69      Total Protein 6.1 (*)      AST 22      Bilirubin, Total 0.5      ALT 20     LACTATE - Abnormal    Lactate 2.1 (*)     Narrative:     Venipuncture immediately after or during the administration of Metamizole may lead to falsely low results. Testing should be performed immediately  prior to Metamizole dosing.   B-TYPE NATRIURETIC PEPTIDE - Abnormal     (*)     Narrative:        <100 pg/mL - Heart failure unlikely  100-299 pg/mL - Intermediate probability of acute heart                  failure exacerbation. Correlate with clinical                  context and patient history.    >=300 pg/mL - Heart Failure likely. Correlate with clinical                  context and patient history.    BNP testing is performed using different testing methodology at [facility] than at other [facility]. Direct result comparisons should only be made within the same method.      SERIAL TROPONIN-INITIAL - Abnormal    Troponin I, High Sensitivity 23 (*)     Narrative:     Less than 99th percentile of normal range cutoff-  Female and children under 18 years old <14 ng/L; Male <21 ng/L: Negative  Repeat testing should be performed if clinically indicated.     Female and children under 18 years old 14-50 ng/L; Male 21-50 ng/L:  Consistent with possible cardiac damage and possible increased clinical   risk. Serial measurements may help to assess extent of myocardial damage.     >50 ng/L: Consistent with cardiac damage, increased clinical risk and  myocardial infarction. Serial measurements may help assess extent of   myocardial damage.      NOTE: Children less than 1 year old may have higher baseline troponin   levels and results should be interpreted in conjunction with the overall   clinical context.     NOTE: Troponin I testing is performed using a different   testing methodology at [facility] than at other   [facility]. Direct result comparisons should only   be made within the same method.   URINALYSIS WITH REFLEX  CULTURE AND MICROSCOPIC - Abnormal    Color, Urine Lena (*)     Appearance, Urine Hazy (*)     Specific Gravity, Urine 1.014      pH, Urine 5.0      Protein, Urine >=500 (3+) (*)     Glucose, Urine >=500 (3+) (*)     Blood, Urine MODERATE (2+) (*)     Ketones, Urine NEGATIVE      Bilirubin, Urine NEGATIVE      Urobilinogen, Urine <2.0      Nitrite, Urine NEGATIVE      Leukocyte Esterase, Urine LARGE (3+) (*)    SERIAL TROPONIN, 1 HOUR - Abnormal    Troponin I, High Sensitivity 26 (*)     Narrative:     Less than 99th percentile of normal range cutoff-  Female and children under 18 years old <14 ng/L; Male <21 ng/L: Negative  Repeat testing should be performed if clinically indicated.     Female and children under 18 years old 14-50 ng/L; Male 21-50 ng/L:  Consistent with possible cardiac damage and possible increased clinical   risk. Serial measurements may help to assess extent of myocardial damage.     >50 ng/L: Consistent with cardiac damage, increased clinical risk and  myocardial infarction. Serial measurements may help assess extent of   myocardial damage.      NOTE: Children less than 1 year old may have higher baseline troponin   levels and results should be interpreted in conjunction with the overall   clinical context.     NOTE: Troponin I testing is performed using a different   testing methodology at [facility] than at other   [facility]. Direct result comparisons should only   be made within the same method.   PROTIME-INR - Abnormal    Protime 16.6 (*)     INR 1.5 (*)    MICROSCOPIC ONLY, URINE - Abnormal    WBC, Urine >50 (*)     WBC Clumps, Urine MODERATE      RBC, Urine >20 (*)     Bacteria, Urine 1+ (*)     Budding Yeast, Urine PRESENT (*)    MAGNESIUM - Normal    Magnesium 2.29     TSH WITH REFLEX TO FREE T4 IF ABNORMAL - Normal    Thyroid Stimulating Hormone 2.40      Narrative:     TSH testing is performed using different testing methodology at [facility] than  at other [facility] hospitals. Direct result comparisons should only be made within the same method.     LACTATE - Normal    Lactate 1.8      Narrative:     Venipuncture immediately after or during the administration of Metamizole may lead to falsely low results. Testing should be performed immediately  prior to Metamizole dosing.   URINE CULTURE   BLOOD CULTURE   BLOOD CULTURE   TROPONIN SERIES- (INITIAL, 1 HR)    Narrative:     The following orders were created for panel order Troponin I Series, High Sensitivity (0, 1 HR).  Procedure                               Abnormality         Status                     ---------                               -----------         ------                     Troponin I, High Sensiti...[[id number]]  Abnormal            Final result               Troponin, High Sensitivi...[[id number]]  Abnormal            Final result                 Please view results for these tests on the individual orders.   TYPE AND SCREEN    ABO TYPE O      Rh TYPE POS      ANTIBODY SCREEN NEG     URINALYSIS WITH REFLEX CULTURE AND MICROSCOPIC    Narrative:     The following orders were created for panel order Urinalysis with Reflex Culture and Microscopic.  Procedure                               Abnormality         Status                     ---------                               -----------         ------                     Urinalysis with Reflex C...[[id number]]  Abnormal            Final result               Extra Urine Gray Tube[[id number]]                            In process                   Please view results for these tests on the individual orders.   EXTRA URINE GRAY TUBE         ED Course & MDM   ED Course as of 04/15/24 1538   Mon Apr 15, 2024   1030 EKG as interpreted by myself demonstrates atrial flutter with a rate of 123, right bundle branch block, there is some ST depressions without specific elevation, no evidence of an acute STEMI [NS]   1049 Repeat EKG demonstrates atrial fibrillation  with a rate of 113, right bundle branch block noted, no evidence of an acute STEMI [NS]   1154 30 EKG demonstrates sinus rhythm now with PVCs, rate of 87, prolonged, QRS and QTc interval with evidence of a right bundle branch block, no evidence of an acute STEMI. [NS]      ED Course User Index  [NS] Tae Molina MD         Diagnoses as of 04/15/24 1538   Acute pyelonephritis   Kidney stone   Retained ureteral stent   Atrial fibrillation with rapid ventricular response (Multi)   Acute kidney injury (CMS-HCC)   Dehydration           Medical Decision Making  All mentioned lab results, ECGs, and imaging were independently reviewed by myself  - Patient evaluated. Patient is presenting to the emergency department today for concern for hypotension and tachycardia.  Initial EKG here demonstrates atrial flutter.  Differential for the patient's new onset a flutter includes potential infection, dehydration, electrolyte derangements, ACS, electrical dysfunction of the heart to name a few.  Patient was started on IV fluids as he appears dehydrated on examination and basic labs were obtained.  Slight leukocytosis without bandemia or left shift, has a new acute kidney injury noted, urinalysis is concerning for infection and so was covered with IV antibiotics.  Troponins show slight elevation at 26 followed by 23 likely in demand from the patient's rate, lactate is normal.  Patient's heart rate and blood pressure did improve with fluid resuscitation and antibiotics.  Repeat EKG now demonstrating sinus rhythm with PVCs.  I spoke with the patient as well as family regarding the patient's workup.  The CT scan is concerning for potential stone as well as concern for mild hydronephrosis on the right side despite the ureteral stent in place.  They told me that the patient is scheduled to see Dr. Clarke at [facility] tomorrow.  They are requesting transfer there if possible.  I did reach out to the [facility] transfer  [place] and spoke with Dr. Matias who accepted the patient for transfer pending a bed.  The patient will be transferred to [facility] as soon as a bed is available.  Will remain on monitoring here in the emergency department.  Signed out to the oncoming team  - Monitored for any changes in stability or symptomatology. Patient remained stable.   - Counseled regarding labs, imaging, diagnosis, and plan. Patient was agreeable. All questions were answered. The patient was receptive and agreeable to the plan of care.       *Disclaimer: This note was dictated by speech recognition. Minor errors in transcription may be present. Please call with questions.    Shelton Molina MD             Your medication list        ASK your doctor about these medications        Instructions Last Dose Given Next Dose Due   Accu-Chek Concetta Plus test strp strip  Generic drug: blood sugar diagnostic           acetaminophen 325 mg tablet  Commonly known as: Tylenol           amLODIPine 10 mg tablet  Commonly known as: Norvasc           aspirin 81 mg EC tablet           cholecalciferol 25 MCG (1000 UT) tablet  Commonly known as: Vitamin D-3           diclofenac sodium 1 % kit           empagliflozin 25 mg  Commonly known as: Jardiance           ibuprofen 200 mg tablet           lisinopril 40 mg tablet           metoprolol tartrate 50 mg tablet  Commonly known as: Lopressor           NovoLOG Mix 70-30 U-100 Insuln 100 unit/mL (70-30) injection  Generic drug: insulin asp prt-insulin aspart           rosuvastatin 40 mg tablet  Commonly known as: Crestor                    Procedure  Critical Care    Performed by: Tae Molina MD  Authorized by: Tae Molina MD    Critical care provider statement:     Critical care time (minutes):  44    Critical care time was exclusive of:  Separately billable procedures and treating other patients    Critical care was time spent personally by me on the following activities:  Development of  treatment plan with patient or surrogate, evaluation of patient's response to treatment, examination of patient, obtaining history from patient or surrogate, ordering and performing treatments and interventions, ordering and review of laboratory studies, ordering and review of radiographic studies and re-evaluation of patient's condition    Care discussed with: accepting provider at another facility         *This report was transcribed using voice recognition software.  Every effort was made to ensure accuracy; however, inadvertent computerized transcription errors may be present.*  Tae Molina MD  04/15/24         Tae Molina MD  04/15/24 1534

## 2024-04-15 NOTE — PROGRESS NOTES
This progress note represents a emergency department transition note for signout of care.    Patient care was signed out to me. Please see the previous provider's notes for the full history and physical. Briefly, Carlito Rivas is 83 y.o. male who had recent hospitalization at [facility] with a infected kidney stone status post stenting.  [facility] and found to be hypotensive and tachycardic.  Patient had new onset A-fib that was treated with IV fluids.  The patient has been resuscitated and is accepted for transfer.  Patient signed out pending transfer to [facility] pending bed.    Patient transferred with no acute events during my shift.  Vital signs remained hemodynamically stable.    Michel Nair DO  Emergency Medicine    ED Course as of 04/16/24 1431   Mon Apr 15, 2024   1030 EKG as interpreted by myself demonstrates atrial flutter with a rate of 123, right bundle branch block, there is some ST depressions without specific elevation, no evidence of an acute STEMI [NS]   1049 Repeat EKG demonstrates atrial fibrillation with a rate of 113, right bundle branch block noted, no evidence of an acute STEMI [NS]   1154 30 EKG demonstrates sinus rhythm now with PVCs, rate of 87, prolonged, QRS and QTc interval with evidence of a right bundle branch block, no evidence of an acute STEMI. [NS]   2034 I reviewed the patient's discharge summary from April 5, 2024.  I placed the patient on insulin sliding scale,     Due to concern of hypotension, I am holding the metoprolol tart Marin 50 mg twice daily in addition to lisinopril daily. [WJ]      ED Course User Index  [NS] Tae Molina MD  [WJ] Marcellus Nair DO         Diagnoses as of 04/16/24 1431   Acute pyelonephritis   Kidney stone   Retained ureteral stent   Atrial fibrillation with rapid ventricular response (Multi)   Acute kidney injury (CMS-HCC)   Dehydration

## 2024-04-17 LAB — BACTERIA UR CULT: ABNORMAL

## 2024-04-18 LAB
ATRIAL RATE: 127 BPM
P AXIS: 248 DEGREES
PR INTERVAL: 76 MS
Q ONSET: 252 MS
QRS COUNT: 20 BEATS
QRS DURATION: 163 MS
QT INTERVAL: 376 MS
QTC CALCULATION(BAZETT): 538 MS
QTC FREDERICIA: 477 MS
R AXIS: 253 DEGREES
T AXIS: 0 DEGREES
T OFFSET: 440 MS
VENTRICULAR RATE: 123 BPM

## 2024-04-19 LAB
BACTERIA BLD CULT: NORMAL
BACTERIA BLD CULT: NORMAL

## 2024-05-03 ENCOUNTER — NURSING HOME VISIT (OUTPATIENT)
Dept: POST ACUTE CARE | Facility: EXTERNAL LOCATION | Age: 84
End: 2024-05-03
Payer: MEDICARE

## 2024-05-03 DIAGNOSIS — N39.0 URINARY TRACT INFECTION WITHOUT HEMATURIA, SITE UNSPECIFIED: Primary | ICD-10-CM

## 2024-05-03 DIAGNOSIS — E11.40 TYPE 2 DIABETES MELLITUS WITH DIABETIC NEUROPATHY, UNSPECIFIED WHETHER LONG TERM INSULIN USE (MULTI): ICD-10-CM

## 2024-05-03 DIAGNOSIS — F03.90 DEMENTIA, UNSPECIFIED DEMENTIA SEVERITY, UNSPECIFIED DEMENTIA TYPE, UNSPECIFIED WHETHER BEHAVIORAL, PSYCHOTIC, OR MOOD DISTURBANCE OR ANXIETY (MULTI): ICD-10-CM

## 2024-05-03 DIAGNOSIS — I48.91 ATRIAL FIBRILLATION, UNSPECIFIED TYPE (MULTI): ICD-10-CM

## 2024-05-03 DIAGNOSIS — N20.0 NEPHROLITHIASIS: ICD-10-CM

## 2024-05-03 DIAGNOSIS — K59.00 CONSTIPATION, UNSPECIFIED CONSTIPATION TYPE: ICD-10-CM

## 2024-05-03 DIAGNOSIS — E78.5 HYPERLIPIDEMIA, UNSPECIFIED HYPERLIPIDEMIA TYPE: ICD-10-CM

## 2024-05-03 DIAGNOSIS — I10 HYPERTENSION, UNSPECIFIED TYPE: ICD-10-CM

## 2024-05-03 DIAGNOSIS — N17.9 AKI (ACUTE KIDNEY INJURY) (CMS-HCC): ICD-10-CM

## 2024-05-03 PROCEDURE — 99309 SBSQ NF CARE MODERATE MDM 30: CPT | Performed by: INTERNAL MEDICINE

## 2024-05-03 NOTE — LETTER
Patient: Carlito Rivas  : 1940    Encounter Date: 2024    HISTORY & PHYSICAL    Subjective  Chief complaint: Carlito Rivas is a 83 y.o. male who is being seen and evaluated for multiple medical problems.  Patient admitted to SNF for therapy due to weakness after recent hospitalization.    HPI:  HPI  Patient had presented to ED for concerns of constipation over the 2 weeks prior to admission.  Patient also had a reduced appetite, and decreased hydration.  Patient did have 1 episode of vomiting.  Patient was on antibiotics for UTI and kidney stones.  Patient did have normal urine output from [facility], dark in appearance.  Patient also noted to have YOANA on labs.  Patient did receive IV fluids.  PT OT evaluated patient with recommendations for SNF placement for further therapy and medical management.  Patient was hemodynamically stable discharge to SNF.  Patient was seen and examined at the bedside, appears to be in no acute distress.  Denies chest pain or shortness of breath.  Denies fever or chills  Past Medical History:   Diagnosis Date   • Abnormal electrocardiogram (ECG) (EKG)     Abnormal EKG   • YOANA (acute kidney injury) (CMS-HCC)    • Atrial fibrillation (Multi)    • Bilateral primary osteoarthritis of knee 2018    Arthritis of both knees   • Dementia (Multi)    • Hypertension    • Old myocardial infarction 2018    History of myocardial infarction   • Personal history of transient ischemic attack (TIA), and cerebral infarction without residual deficits     History of transient cerebral ischemia   • Type 2 diabetes mellitus (Multi)    • Urinary retention    • Urinary tract infection        Past Surgical History:   Procedure Laterality Date   • ADENOIDECTOMY  10/12/2016    Adenoidectomy   • CATARACT EXTRACTION  2017    Cataract Surgery   • CORONARY ANGIOPLASTY  2021    PTCA   • OTHER SURGICAL HISTORY  2018    Hip Replacement Left   • TONSILLECTOMY  10/12/2016    Tonsillectomy    • TOTAL HIP ARTHROPLASTY  05/23/2018    Hip Replacement       Family History   Problem Relation Name Age of Onset   • Other (cardiovascular disease) Mother     • Coronary artery disease Father     • Heart attack Father         Social History     Socioeconomic History   • Marital status:      Spouse name: Not on file   • Number of children: Not on file   • Years of education: Not on file   • Highest education level: Not on file   Occupational History   • Not on file   Tobacco Use   • Smoking status: Never   • Smokeless tobacco: Never   Substance and Sexual Activity   • Alcohol use: Never   • Drug use: Never   • Sexual activity: Not on file   Other Topics Concern   • Not on file   Social History Narrative   • Not on file     Social Determinants of Health     Financial Resource Strain: Low Risk  (7/11/2023)    Received from [facility]    Overall Financial Resource Strain (CARDIA)    • Difficulty of Paying Living Expenses: Not hard at all   Food Insecurity: No Food Insecurity (4/4/2024)    Received from [facility]    Hunger Vital Sign    • Worried About Running Out of Food in the Last Year: Never true    • Ran Out of Food in the Last Year: Never true   Transportation Needs: No Transportation Needs (4/4/2024)    Received from [facility]    PRAPARE - Transportation    • Lack of Transportation (Medical): No    • Lack of Transportation (Non-Medical): No   Physical Activity: Not on file   Stress: Not on file   Social Connections: Not on file   Intimate Partner Violence: Not on file   Housing Stability: Unknown (4/4/2024)    Received from [facility]    Housing Stability Vital Sign    • Unable to Pay for Housing in the Last Year: No    • Number of Places Lived in the Last Year: Not on file    • Unstable Housing in the Last Year: No       Vital signs: 81/67, 80 14, 94% 98.4    Objective  Physical Exam  Constitutional:       General: He is not in acute distress.  Eyes:      Extraocular Movements:  Extraocular movements intact.   Cardiovascular:      Rate and Rhythm: Normal rate and regular rhythm.   Pulmonary:      Effort: Pulmonary effort is normal.      Breath sounds: Normal breath sounds.   Abdominal:      General: Bowel sounds are normal.      Palpations: Abdomen is soft.   Genitourinary:     Comments: [facility]  Musculoskeletal:      Cervical back: Neck supple.      Right lower leg: No edema.      Left lower leg: No edema.   Neurological:      Mental Status: He is alert.   Psychiatric:         Mood and Affect: Mood normal.         Behavior: Behavior is cooperative.         Assessment/Plan  Problem List Items Addressed This Visit       Hypertension     Monitor blood pressure  Lisinopril  Metoprolol         Dementia (Multi)     Assist with ADLs.  Monitor for changes         Hyperlipidemia     Statin  Monitor labs         Type 2 diabetes mellitus with diabetic neuropathy, unspecified (Multi)     Glucoscan  Sliding scale insulin  Monitor fasting blood sugar         Nephrolithiasis     Status post stent  F/u outpatient         UTI (urinary tract infection) - Primary     Continue antibiotic until complete.  5/18/2024         Constipation     Improved         YOANA (acute kidney injury) (CMS-[facility])     Status post IV fluids  Stable at discharge.  Continue monitor         Atrial fibrillation (Multi)     Monitor heart rate  Metoprolol          Hospital records reviewed  Medications, treatments, and labs reviewed  Continue medications and treatments as listed in EMR  Discussed with nursing and therapy      Scribe Attestation  I, Kristi Waite   attest that this documentation has been prepared under the direction and in the presence of Nitin Hinds MD    Provider Attestation - Scribe documentation  All medical record entries made by the Scribe were at my direction and personally dictated by me. I have reviewed the chart and agree that the record accurately reflects my personal performance of the history,  physical exam, discussion and plan.   Nitin Hinds MD      Electronically Signed By: Nitin Hinds MD   5/3/24  3:52 PM

## 2024-05-03 NOTE — PROGRESS NOTES
HISTORY & PHYSICAL    Subjective   Chief complaint: Carlito Rivas is a 83 y.o. male who is being seen and evaluated for multiple medical problems.  Patient admitted to SNF for therapy due to weakness after recent hospitalization.    HPI:  HPI  Patient had presented to ED for concerns of constipation over the 2 weeks prior to admission.  Patient also had a reduced appetite, and decreased hydration.  Patient did have 1 episode of vomiting.  Patient was on antibiotics for UTI and kidney stones.  Patient did have normal urine output from [facility], dark in appearance.  Patient also noted to have YOANA on labs.  Patient did receive IV fluids.  PT OT evaluated patient with recommendations for SNF placement for further therapy and medical management.  Patient was hemodynamically stable discharge to SNF.  Patient was seen and examined at the bedside, appears to be in no acute distress.  Denies chest pain or shortness of breath.  Denies fever or chills  Past Medical History:   Diagnosis Date    Abnormal electrocardiogram (ECG) (EKG)     Abnormal EKG    YOANA (acute kidney injury) (CMS-HCC)     Atrial fibrillation (Multi)     Bilateral primary osteoarthritis of knee 05/23/2018    Arthritis of both knees    Dementia (Multi)     Hypertension     Old myocardial infarction 05/23/2018    History of myocardial infarction    Personal history of transient ischemic attack (TIA), and cerebral infarction without residual deficits     History of transient cerebral ischemia    Type 2 diabetes mellitus (Multi)     Urinary retention     Urinary tract infection        Past Surgical History:   Procedure Laterality Date    ADENOIDECTOMY  10/12/2016    Adenoidectomy    CATARACT EXTRACTION  04/12/2017    Cataract Surgery    CORONARY ANGIOPLASTY  04/19/2021    PTCA    OTHER SURGICAL HISTORY  05/23/2018    Hip Replacement Left    TONSILLECTOMY  10/12/2016    Tonsillectomy    TOTAL HIP ARTHROPLASTY  05/23/2018    Hip Replacement       Family History    Problem Relation Name Age of Onset    Other (cardiovascular disease) Mother      Coronary artery disease Father      Heart attack Father         Social History     Socioeconomic History    Marital status:      Spouse name: Not on file    Number of children: Not on file    Years of education: Not on file    Highest education level: Not on file   Occupational History    Not on file   Tobacco Use    Smoking status: Never    Smokeless tobacco: Never   Substance and Sexual Activity    Alcohol use: Never    Drug use: Never    Sexual activity: Not on file   Other Topics Concern    Not on file   Social History Narrative    Not on file     Social Determinants of Health     Financial Resource Strain: Low Risk  (7/11/2023)    Received from [facility]    Overall Financial Resource Strain (CARDIA)     Difficulty of Paying Living Expenses: Not hard at all   Food Insecurity: No Food Insecurity (4/4/2024)    Received from [facility]    Hunger Vital Sign     Worried About Running Out of Food in the Last Year: Never true     Ran Out of Food in the Last Year: Never true   Transportation Needs: No Transportation Needs (4/4/2024)    Received from [facility]    PRAPARE - Transportation     Lack of Transportation (Medical): No     Lack of Transportation (Non-Medical): No   Physical Activity: Not on file   Stress: Not on file   Social Connections: Not on file   Intimate Partner Violence: Not on file   Housing Stability: Unknown (4/4/2024)    Received from [facility]    Housing Stability Vital Sign     Unable to Pay for Housing in the Last Year: No     Number of Places Lived in the Last Year: Not on file     Unstable Housing in the Last Year: No       Vital signs: 81/67, 80 14, 94% 98.4    Objective   Physical Exam  Constitutional:       General: He is not in acute distress.  Eyes:      Extraocular Movements: Extraocular movements intact.   Cardiovascular:      Rate and Rhythm: Normal rate and regular  rhythm.   Pulmonary:      Effort: Pulmonary effort is normal.      Breath sounds: Normal breath sounds.   Abdominal:      General: Bowel sounds are normal.      Palpations: Abdomen is soft.   Genitourinary:     Comments: [facility]  Musculoskeletal:      Cervical back: Neck supple.      Right lower leg: No edema.      Left lower leg: No edema.   Neurological:      Mental Status: He is alert.   Psychiatric:         Mood and Affect: Mood normal.         Behavior: Behavior is cooperative.         Assessment/Plan   Problem List Items Addressed This Visit       Hypertension     Monitor blood pressure  Lisinopril  Metoprolol         Dementia (Multi)     Assist with ADLs.  Monitor for changes         Hyperlipidemia     Statin  Monitor labs         Type 2 diabetes mellitus with diabetic neuropathy, unspecified (Multi)     Glucoscan  Sliding scale insulin  Monitor fasting blood sugar         Nephrolithiasis     Status post stent  F/u outpatient         UTI (urinary tract infection) - Primary     Continue antibiotic until complete.  5/18/2024         Constipation     Improved         YOANA (acute kidney injury) (CMS-[facility])     Status post IV fluids  Stable at discharge.  Continue monitor         Atrial fibrillation (Multi)     Monitor heart rate  Metoprolol          Hospital records reviewed  Medications, treatments, and labs reviewed  Continue medications and treatments as listed in EMR  Discussed with nursing and therapy      Scribe Attestation  DEDE, Dulce Albarado Scribe   attest that this documentation has been prepared under the direction and in the presence of Nitin Hinds MD    Provider Attestation - Scribe documentation  All medical record entries made by the Scribe were at my direction and personally dictated by me. I have reviewed the chart and agree that the record accurately reflects my personal performance of the history, physical exam, discussion and plan.   Nitin Hinds MD

## 2024-05-06 ENCOUNTER — NURSING HOME VISIT (OUTPATIENT)
Dept: POST ACUTE CARE | Facility: EXTERNAL LOCATION | Age: 84
End: 2024-05-06
Payer: MEDICARE

## 2024-05-06 DIAGNOSIS — E11.40 TYPE 2 DIABETES MELLITUS WITH DIABETIC NEUROPATHY, UNSPECIFIED WHETHER LONG TERM INSULIN USE (MULTI): ICD-10-CM

## 2024-05-06 DIAGNOSIS — M25.561 RIGHT KNEE PAIN, UNSPECIFIED CHRONICITY: ICD-10-CM

## 2024-05-06 DIAGNOSIS — R31.9 HEMATURIA, UNSPECIFIED TYPE: Primary | ICD-10-CM
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
## 2024-05-06 PROCEDURE — 99308 SBSQ NF CARE LOW MDM 20: CPT | Performed by: NURSE PRACTITIONER

## 2024-05-06 NOTE — LETTER
Patient: Carlito Rivas  : 1940    Encounter Date: 2024    PROGRESS NOTE   blood  Subjective  Chief complaint: Carlito Rivas is a 83 y.o. male who is an acute skilled patient being seen and evaluated for weakness    HPI:  HPI  Therapy has been working with the patient to improve strength and endurance with ADLs, transfers, and mobility.  Patient continues to work toward goals.  Nurse called  and reported that patient had a fall, pulled pickering out, and sustained a skin tear.  Patient was sen to the ed and returned.  No new concerns otherwise per nursing staff.      Objective:    Vital signs:   128/50, 97.8, 90 %17, blood sugar is 138    Physical Exam  Constitutional:       General: He is not in acute distress.  Eyes:      Extraocular Movements: Extraocular movements intact.   Cardiovascular:      Rate and Rhythm: Normal rate and regular rhythm.   Pulmonary:      Effort: Pulmonary effort is normal.      Breath sounds: Normal breath sounds.   Abdominal:      General: Bowel sounds are normal.      Palpations: Abdomen is soft.   Genitourinary:     Comments: [place] - urine bloody  Musculoskeletal:      Cervical back: Neck supple.      Right lower leg: No edema.      Left lower leg: No edema.      Comments: Right knee pain and swelling, patient states was present prior to fall   Skin:     Comments: Dressing RFA clean dry and intact   Neurological:      Mental Status: He is alert.   Psychiatric:         Mood and Affect: Mood normal.         Behavior: Behavior is cooperative.         Assessment/Plan  Problem List Items Addressed This Visit       Fall       Right knee pain and swelling.  Patient states present prior to fall revising x-ray of right knee ,monitor,.           Hematuria - Primary     Secondary to trauma, irrigate catheter, hold anticoagulant to bleeding         Right knee pain     Patient refusing x-ray, will monitor         Type 2 diabetes mellitus with diabetic neuropathy, unspecified (Multi)      Glucoscan  Sliding scale insulin  Fasting blood glucose at goal          Medications, treatments, and labs reviewed  Continue medications and treatments as listed in EMR    ARLEEN Brewer    Scribe Attestation  IHeavenly Scribe   attest that this documentation has been prepared under the direction and in the presence of ARLEEN Brewer    Provider Attestation - Scribe documentation  All medical record entries made by the Scribe were at my direction and personally dictated by me. I have reviewed the chart and agree that the record accurately reflects my personal performance of the history, physical exam, discussion and plan.      Electronically Signed By: ARLEEN Brewer   5/28/24 11:56 AM

## 2024-05-07 ENCOUNTER — NURSING HOME VISIT (OUTPATIENT)
Dept: POST ACUTE CARE | Facility: EXTERNAL LOCATION | Age: 84
End: 2024-05-07
Payer: MEDICARE

## 2024-05-07 DIAGNOSIS — I48.91 ATRIAL FIBRILLATION, UNSPECIFIED TYPE (MULTI): ICD-10-CM

## 2024-05-07 DIAGNOSIS — E11.40 TYPE 2 DIABETES MELLITUS WITH DIABETIC NEUROPATHY, UNSPECIFIED WHETHER LONG TERM INSULIN USE (MULTI): ICD-10-CM

## 2024-05-07 DIAGNOSIS — R53.1 WEAKNESS: ICD-10-CM

## 2024-05-07 DIAGNOSIS — F03.90 DEMENTIA, UNSPECIFIED DEMENTIA SEVERITY, UNSPECIFIED DEMENTIA TYPE, UNSPECIFIED WHETHER BEHAVIORAL, PSYCHOTIC, OR MOOD DISTURBANCE OR ANXIETY (MULTI): ICD-10-CM

## 2024-05-07 DIAGNOSIS — I10 HYPERTENSION, UNSPECIFIED TYPE: ICD-10-CM

## 2024-05-07 PROCEDURE — 99309 SBSQ NF CARE MODERATE MDM 30: CPT | Performed by: INTERNAL MEDICINE

## 2024-05-07 NOTE — PROGRESS NOTES
PROGRESS NOTE    Subjective   Chief complaint: Carlito Rivas is a 83 y.o. male who is an acute skilled patient being seen and evaluated for weakness    HPI:  Patient admitted to SNF for therapy d/t weakness after recent hospitalization.  Patient requires assist with ADLs and transfers. Patient has been working in therapy to improve strength, endurance, and ADLs.  Patient continues to work toward goals. No new complaints.        Objective   Vital signs: 142/87,61,98%,     Physical Exam  Constitutional:       General: He is not in acute distress.  Eyes:      Extraocular Movements: Extraocular movements intact.   Cardiovascular:      Rate and Rhythm: Normal rate and regular rhythm.   Pulmonary:      Effort: Pulmonary effort is normal.      Breath sounds: Normal breath sounds.   Abdominal:      General: Bowel sounds are normal.      Palpations: Abdomen is soft.   Genitourinary:     Comments: [facility]  Musculoskeletal:      Cervical back: Neck supple.      Right lower leg: No edema.      Left lower leg: No edema.   Neurological:      Mental Status: He is alert.   Psychiatric:         Mood and Affect: Mood normal.         Behavior: Behavior is cooperative.         Assessment/Plan   Problem List Items Addressed This Visit       Hypertension     Monitor blood pressure  Lisinopril  Metoprolol         Dementia (Multi)     Assist with ADLs.  Monitor for changes         Type 2 diabetes mellitus with diabetic neuropathy, unspecified (Multi)     Glucoscan  Sliding scale insulin  Monitor fasting blood sugar         Atrial fibrillation (Multi)     Monitor heart rate  Metoprolol         Weakness     Continue working with therapy           Medications, treatments, and labs reviewed  Continue medications and treatments as listed in EMR    Scribe Attestation  Divya AGUAYO Scribe   attest that this documentation has been prepared under the direction and in the presence of Nitin Hinds MD.     Provider Attestation - Scribe  documentation  All medical record entries made by the Scribe were at my direction and personally dictated by me. I have reviewed the chart and agree that the record accurately reflects my personal performance of the history, physical exam, discussion and plan.   Nitin Hinds MD

## 2024-05-07 NOTE — LETTER
Patient: Carlito Rivas  : 1940    Encounter Date: 2024    PROGRESS NOTE    Subjective  Chief complaint: Carlito Rivas is a 83 y.o. male who is an acute skilled patient being seen and evaluated for weakness    HPI:  Patient admitted to SNF for therapy d/t weakness after recent hospitalization.  Patient requires assist with ADLs and transfers. Patient has been working in therapy to improve strength, endurance, and ADLs.  Patient continues to work toward goals. No new complaints.        Objective  Vital signs: 142/87,61,98%,     Physical Exam  Constitutional:       General: He is not in acute distress.  Eyes:      Extraocular Movements: Extraocular movements intact.   Cardiovascular:      Rate and Rhythm: Normal rate and regular rhythm.   Pulmonary:      Effort: Pulmonary effort is normal.      Breath sounds: Normal breath sounds.   Abdominal:      General: Bowel sounds are normal.      Palpations: Abdomen is soft.   Genitourinary:     Comments: [facility]  Musculoskeletal:      Cervical back: Neck supple.      Right lower leg: No edema.      Left lower leg: No edema.   Neurological:      Mental Status: He is alert.   Psychiatric:         Mood and Affect: Mood normal.         Behavior: Behavior is cooperative.         Assessment/Plan  Problem List Items Addressed This Visit       Hypertension     Monitor blood pressure  Lisinopril  Metoprolol         Dementia (Multi)     Assist with ADLs.  Monitor for changes         Type 2 diabetes mellitus with diabetic neuropathy, unspecified (Multi)     Glucoscan  Sliding scale insulin  Monitor fasting blood sugar         Atrial fibrillation (Multi)     Monitor heart rate  Metoprolol         Weakness     Continue working with therapy           Medications, treatments, and labs reviewed  Continue medications and treatments as listed in EMR    Scribe Attestation  I, Kristi Williamson   attest that this documentation has been prepared under the direction and in the  presence of Nitin Hinds MD.     Provider Attestation - Scribe documentation  All medical record entries made by the Scribe were at my direction and personally dictated by me. I have reviewed the chart and agree that the record accurately reflects my personal performance of the history, physical exam, discussion and plan.   Nitin Hinds MD      Electronically Signed By: Nitin Hinds MD   5/7/24  3:14 PM

## 2024-05-08 ENCOUNTER — NURSING HOME VISIT (OUTPATIENT)
Dept: POST ACUTE CARE | Facility: EXTERNAL LOCATION | Age: 84
End: 2024-05-08
Payer: MEDICARE

## 2024-05-08 DIAGNOSIS — I10 HYPERTENSION, UNSPECIFIED TYPE: ICD-10-CM

## 2024-05-08 DIAGNOSIS — R53.1 WEAKNESS: Primary | ICD-10-CM

## 2024-05-08 DIAGNOSIS — I48.91 ATRIAL FIBRILLATION, UNSPECIFIED TYPE (MULTI): ICD-10-CM

## 2024-05-08 DIAGNOSIS — E11.40 TYPE 2 DIABETES MELLITUS WITH DIABETIC NEUROPATHY, UNSPECIFIED WHETHER LONG TERM INSULIN USE (MULTI): ICD-10-CM

## 2024-05-08 DIAGNOSIS — R82.90 CLOUDY URINE: ICD-10-CM

## 2024-05-08 DIAGNOSIS — F03.90 DEMENTIA, UNSPECIFIED DEMENTIA SEVERITY, UNSPECIFIED DEMENTIA TYPE, UNSPECIFIED WHETHER BEHAVIORAL, PSYCHOTIC, OR MOOD DISTURBANCE OR ANXIETY (MULTI): ICD-10-CM

## 2024-05-08 PROCEDURE — 99309 SBSQ NF CARE MODERATE MDM 30: CPT | Performed by: NURSE PRACTITIONER

## 2024-05-08 NOTE — LETTER
Patient: Carlito Rivas  : 1940    Encounter Date: 2024    PROGRESS NOTE    Subjective  Chief complaint: Carlito Rivas is a 83 y.o. male who is an acute skilled patient being seen and evaluated for weakness    HPI:  HPI  Patient is continue to work in therapy.  Patient is working on gait training.  Patient is able to ambulate with a wheeled walker 100 feet requiring CGA.  Therapy is also working with patient on ADLs for upper and lower body bathing and dressing requiring mod assist to be completed.  Speech therapy working with patient to address cognition and dysphagia.  Patient seen and examined at the bedside, appears to be in no acute distress.    Objective  Vital signs: 133/79, blood sugar 147    Physical Exam  Constitutional:       General: He is not in acute distress.  Eyes:      Extraocular Movements: Extraocular movements intact.   Cardiovascular:      Rate and Rhythm: Normal rate and regular rhythm.   Pulmonary:      Effort: Pulmonary effort is normal.      Breath sounds: Normal breath sounds.   Genitourinary:     Comments: [facility]za-urine cloudy yellow  Musculoskeletal:      Cervical back: Neck supple.      Right lower leg: No edema.      Left lower leg: No edema.   Neurological:      Mental Status: He is alert.      Motor: Weakness present.   Psychiatric:         Mood and Affect: Mood normal.         Behavior: Behavior is cooperative.         Assessment/Plan  Problem List Items Addressed This Visit       Atrial fibrillation (Multi)     Monitor heart rate  Metoprolol  AC on hold         Cloudy urine     Obtain BMP and CBC         Dementia (Multi)     Assist with ADLs.  Monitor for changes  Speech therapy         Hypertension     Monitor blood pressure  Lisinopril  Metoprolol  BP at goal           Type 2 diabetes mellitus with diabetic neuropathy, unspecified (Multi)     Glucoscan  Sliding scale insulin  Monitor fasting blood sugar         Weakness - Primary     Continuing therapy, work towards  goals          Medications, treatments, and labs reviewed  Continue medications and treatments as listed in EMR      Scribe Attestation  I, Kristi Waite   attest that this documentation has been prepared under the direction and in the presence of ARLEEN Brewer    Provider Attestation - Scribe documentation  All medical record entries made by the Scribe were at my direction and personally dictated by me. I have reviewed the chart and agree that the record accurately reflects my personal performance of the history, physical exam, discussion and plan.   ARLEEN Brewer        Electronically Signed By: ARLEEN Brewer   5/15/24  4:03 PM

## 2024-05-09 ENCOUNTER — NURSING HOME VISIT (OUTPATIENT)
Dept: POST ACUTE CARE | Facility: EXTERNAL LOCATION | Age: 84
End: 2024-05-09
Payer: MEDICARE

## 2024-05-09 DIAGNOSIS — R53.1 WEAKNESS: Primary | ICD-10-CM

## 2024-05-09 DIAGNOSIS — I10 HYPERTENSION, UNSPECIFIED TYPE: ICD-10-CM

## 2024-05-09 DIAGNOSIS — F03.90 DEMENTIA, UNSPECIFIED DEMENTIA SEVERITY, UNSPECIFIED DEMENTIA TYPE, UNSPECIFIED WHETHER BEHAVIORAL, PSYCHOTIC, OR MOOD DISTURBANCE OR ANXIETY (MULTI): ICD-10-CM

## 2024-05-09 DIAGNOSIS — I48.91 ATRIAL FIBRILLATION, UNSPECIFIED TYPE (MULTI): ICD-10-CM

## 2024-05-09 DIAGNOSIS — E11.40 TYPE 2 DIABETES MELLITUS WITH DIABETIC NEUROPATHY, UNSPECIFIED WHETHER LONG TERM INSULIN USE (MULTI): ICD-10-CM

## 2024-05-09 PROCEDURE — 99309 SBSQ NF CARE MODERATE MDM 30: CPT | Performed by: NURSE PRACTITIONER

## 2024-05-09 NOTE — LETTER
Patient: Carlito Rivas  : 1940    Encounter Date: 2024    PROGRESS NOTE    Subjective  Chief complaint: Carlito Rivas is a 83 y.o. male who is an acute skilled patient being seen and evaluated for weakness    HPI:  HPI  Patient does continue to work in therapy.  Patient is working on gait training with wheeled walker.  Patient is able to ambulate 100 feet with wheeled walker and CGA.  Patient is working on ADLs, upper and lower body requiring mod assist to be completed.  Speech therapy is working with patient to address cognition and dysphagia.  Patient seen and examined at the bedside, appears to be in no acute distress.    Objective  Vital signs: 133/79, blood sugar 160    Physical Exam  Constitutional:       General: He is not in acute distress.  Eyes:      Extraocular Movements: Extraocular movements intact.   Cardiovascular:      Rate and Rhythm: Normal rate and regular rhythm.   Pulmonary:      Effort: Pulmonary effort is normal.      Breath sounds: Normal breath sounds.   Musculoskeletal:      Cervical back: Neck supple.      Right lower leg: No edema.      Left lower leg: No edema.   Neurological:      Mental Status: He is alert.      Motor: Weakness present.         Assessment/Plan  Problem List Items Addressed This Visit       Atrial fibrillation (Multi)     Monitor heart rate  Metoprolol  AC on hold         Dementia (Multi)     Assist with ADLs.  Monitor for changes  Speech therapy         Hypertension     BP at goal  Monitor blood pressure  Lisinopril  Metoprolol         Type 2 diabetes mellitus with diabetic neuropathy, unspecified (Multi)     Glucoscan  Sliding scale insulin  Monitor fasting blood sugar         Weakness - Primary     Continue to work towards goals in therapy          Medications, treatments, and labs reviewed  Continue medications and treatments as listed in EMR      Scribe Attestation  I, Kristi Waite   attest that this documentation has been prepared under the  direction and in the presence of ARLEEN Brewer    Provider Attestation - Scribe documentation  All medical record entries made by the Scribe were at my direction and personally dictated by me. I have reviewed the chart and agree that the record accurately reflects my personal performance of the history, physical exam, discussion and plan.   ARLEEN Brewer        Electronically Signed By: ARLEEN Brewer   5/21/24 12:29 PM

## 2024-05-10 ENCOUNTER — NURSING HOME VISIT (OUTPATIENT)
Dept: POST ACUTE CARE | Facility: EXTERNAL LOCATION | Age: 84
End: 2024-05-10
Payer: MEDICARE

## 2024-05-10 DIAGNOSIS — R31.9 HEMATURIA, UNSPECIFIED TYPE: ICD-10-CM

## 2024-05-10 DIAGNOSIS — R53.1 WEAKNESS: Primary | ICD-10-CM

## 2024-05-10 DIAGNOSIS — I10 HYPERTENSION, UNSPECIFIED TYPE: ICD-10-CM

## 2024-05-10 DIAGNOSIS — N39.0 URINARY TRACT INFECTION WITHOUT HEMATURIA, SITE UNSPECIFIED: ICD-10-CM

## 2024-05-10 DIAGNOSIS — I48.91 ATRIAL FIBRILLATION, UNSPECIFIED TYPE (MULTI): ICD-10-CM

## 2024-05-10 DIAGNOSIS — F03.90 DEMENTIA, UNSPECIFIED DEMENTIA SEVERITY, UNSPECIFIED DEMENTIA TYPE, UNSPECIFIED WHETHER BEHAVIORAL, PSYCHOTIC, OR MOOD DISTURBANCE OR ANXIETY (MULTI): ICD-10-CM

## 2024-05-10 PROCEDURE — 99308 SBSQ NF CARE LOW MDM 20: CPT | Performed by: INTERNAL MEDICINE

## 2024-05-10 NOTE — ASSESSMENT & PLAN NOTE
Assist with ADLs.  Monitor for changes  Speech therapy  
Continue antibiotic until complete.  5/18/2024  
Continue for pressing towards established goals in therapy  
Monitor blood pressure  Lisinopril  Metoprolol  
Monitor heart rate  Metoprolol  AC on hold  
Resolved  AC on hold  
per alesia pt owns DME rolling walker

## 2024-05-10 NOTE — LETTER
Patient: Carlito Rivas  : 1940    Encounter Date: 05/10/2024    PROGRESS NOTE    Subjective  Chief complaint: Carlito Rivas is a 83 y.o. male who is an acute skilled patient being seen and evaluated for weakness    HPI:  HPI  Patient is continue to work on gait training and therapy.  Patient is ambulating with wheeled walker.  Patient is able to ambulate with wheeled walker 100 feet and CGA.  Patient is completing ADL tasks of upper and lower body with mod assist.  Speech therapy working patient to address cognition and dysphagia.  Patient was seen and examined at the bedside.  Patient does continue on antibiotic for UTI, tolerating without adverse effects.  Patient's hematuria resolved, anticoagulant was on hold.  Patient denies chest pain or shortness of breath.  Denies nausea or vomiting.    Objective  Vital signs: 140/82, 61, 18, 97.5, 98%    Physical Exam  Constitutional:       General: He is not in acute distress.  Eyes:      Extraocular Movements: Extraocular movements intact.   Cardiovascular:      Rate and Rhythm: Normal rate and regular rhythm.   Pulmonary:      Effort: Pulmonary effort is normal.      Breath sounds: Normal breath sounds.   Abdominal:      General: Bowel sounds are normal.      Palpations: Abdomen is soft.   Genitourinary:     Comments: Esparza  Musculoskeletal:      Cervical back: Neck supple.      Right lower leg: No edema.      Left lower leg: No edema.   Neurological:      Mental Status: He is alert.      Motor: Weakness present.   Psychiatric:         Mood and Affect: Mood normal.         Behavior: Behavior is cooperative.         Assessment/Plan  Problem List Items Addressed This Visit       Hypertension     Monitor blood pressure  Lisinopril  Metoprolol         Dementia (Multi)     Assist with ADLs.  Monitor for changes  Speech therapy         UTI (urinary tract infection)     Continue antibiotic until complete.  2024         Atrial fibrillation (Multi)     Monitor heart  rate  Metoprolol  AC on hold         Weakness - Primary     Continue for pressing towards established goals in therapy         Hematuria     Resolved  AC on hold          Medications, treatments, and labs reviewed  Continue medications and treatments as listed in EMR      Scribe Attestation  I, Kristi Waite   attest that this documentation has been prepared under the direction and in the presence of Nitin Hinds MD    Provider Attestation - Scribe documentation  All medical record entries made by the Scribe were at my direction and personally dictated by me. I have reviewed the chart and agree that the record accurately reflects my personal performance of the history, physical exam, discussion and plan.   Nitin Hinds MD        Electronically Signed By: Nitin Hinds MD   5/10/24  4:04 PM

## 2024-05-10 NOTE — PROGRESS NOTES
PROGRESS NOTE    Subjective   Chief complaint: Carlito Rivas is a 83 y.o. male who is an acute skilled patient being seen and evaluated for weakness    HPI:  HPI  Patient is continue to work on gait training and therapy.  Patient is ambulating with wheeled walker.  Patient is able to ambulate with wheeled walker 100 feet and CGA.  Patient is completing ADL tasks of upper and lower body with mod assist.  Speech therapy working patient to address cognition and dysphagia.  Patient was seen and examined at the bedside.  Patient does continue on antibiotic for UTI, tolerating without adverse effects.  Patient's hematuria resolved, anticoagulant was on hold.  Patient denies chest pain or shortness of breath.  Denies nausea or vomiting.    Objective   Vital signs: 140/82, 61, 18, 97.5, 98%    Physical Exam  Constitutional:       General: He is not in acute distress.  Eyes:      Extraocular Movements: Extraocular movements intact.   Cardiovascular:      Rate and Rhythm: Normal rate and regular rhythm.   Pulmonary:      Effort: Pulmonary effort is normal.      Breath sounds: Normal breath sounds.   Abdominal:      General: Bowel sounds are normal.      Palpations: Abdomen is soft.   Genitourinary:     Comments: Esparza  Musculoskeletal:      Cervical back: Neck supple.      Right lower leg: No edema.      Left lower leg: No edema.   Neurological:      Mental Status: He is alert.      Motor: Weakness present.   Psychiatric:         Mood and Affect: Mood normal.         Behavior: Behavior is cooperative.         Assessment/Plan   Problem List Items Addressed This Visit       Hypertension     Monitor blood pressure  Lisinopril  Metoprolol         Dementia (Multi)     Assist with ADLs.  Monitor for changes  Speech therapy         UTI (urinary tract infection)     Continue antibiotic until complete.  5/18/2024         Atrial fibrillation (Multi)     Monitor heart rate  Metoprolol  AC on hold         Weakness - Primary     Continue  for pressing towards established goals in therapy         Hematuria     Resolved  AC on hold          Medications, treatments, and labs reviewed  Continue medications and treatments as listed in EMR      Scribe Attestation  I, Kristi Waite   attest that this documentation has been prepared under the direction and in the presence of Nitin Hinds MD    Provider Attestation - Scribe documentation  All medical record entries made by the Scribe were at my direction and personally dictated by me. I have reviewed the chart and agree that the record accurately reflects my personal performance of the history, physical exam, discussion and plan.   Nitin Hinds MD

## 2024-05-13 ENCOUNTER — NURSING HOME VISIT (OUTPATIENT)
Dept: POST ACUTE CARE | Facility: EXTERNAL LOCATION | Age: 84
End: 2024-05-13
Payer: MEDICARE

## 2024-05-13 DIAGNOSIS — R31.9 HEMATURIA, UNSPECIFIED TYPE: ICD-10-CM

## 2024-05-13 DIAGNOSIS — R52 PAIN: ICD-10-CM

## 2024-05-13 DIAGNOSIS — R53.1 WEAKNESS: Primary | ICD-10-CM

## 2024-05-13 DIAGNOSIS — I10 HYPERTENSION, UNSPECIFIED TYPE: ICD-10-CM

## 2024-05-13 DIAGNOSIS — F03.90 DEMENTIA, UNSPECIFIED DEMENTIA SEVERITY, UNSPECIFIED DEMENTIA TYPE, UNSPECIFIED WHETHER BEHAVIORAL, PSYCHOTIC, OR MOOD DISTURBANCE OR ANXIETY (MULTI): ICD-10-CM

## 2024-05-13 DIAGNOSIS — E11.40 TYPE 2 DIABETES MELLITUS WITH DIABETIC NEUROPATHY, UNSPECIFIED WHETHER LONG TERM INSULIN USE (MULTI): ICD-10-CM

## 2024-05-13 PROCEDURE — 99309 SBSQ NF CARE MODERATE MDM 30: CPT | Performed by: NURSE PRACTITIONER

## 2024-05-13 NOTE — LETTER
Patient: Carlito Rivas  : 1940    Encounter Date: 2024    PROGRESS NOTE    Subjective  Chief complaint: Carlito Rivas is a 83 y.o. male who is an acute skilled patient being seen and evaluated for weakness    HPI:  HPI  Patient does continue to work in therapy.  Patient is working on gait training with wheeled walker and CGA.  Patient is able to ambulate 100 feet.  Patient is also working on ADLs and self-care for upper and lower body requiring mod assist.  Speech therapy is working with patient to address cognition and dysphagia.  Patient seen and examined at the bedside, appears to be in no acute distress.  Nurse reporting patient had a fall    Objective  Vital signs: 132/76, 72, 99% 97.1, 17, blood sugar 154    Physical Exam  Constitutional:       General: He is not in acute distress.  Eyes:      Extraocular Movements: Extraocular movements intact.   Cardiovascular:      Rate and Rhythm: Normal rate and regular rhythm.   Pulmonary:      Effort: Pulmonary effort is normal.      Breath sounds: Normal breath sounds.   Abdominal:      General: Bowel sounds are normal.      Palpations: Abdomen is soft.   Genitourinary:     Comments: Esparza-urine clear yellow  Musculoskeletal:      Cervical back: Neck supple.      Right lower leg: No edema.      Left lower leg: No edema.      Comments: Left ankle and right ribs tender to palpation   Neurological:      Mental Status: He is alert.      Motor: Weakness present.   Psychiatric:         Mood and Affect: Mood normal.         Behavior: Behavior is cooperative.         Assessment/Plan  Problem List Items Addressed This Visit       Hypertension     Monitor blood pressure  Lisinopril  Metoprolol         Dementia (Multi)     Assist with ADLs.  Monitor for changes  Speech therapy         Type 2 diabetes mellitus with diabetic neuropathy, unspecified (Multi)     Glucoscan  Sliding scale insulin  Monitor fasting blood sugar         Weakness - Primary     Continue therapy          Hematuria     Resolved, resume anticoagulant         Pain     Left ankle and rib pain: Obtain x-ray          Medications, treatments, and labs reviewed  Continue medications and treatments as listed in EMR      Scribe Attestation  IDulce Scribe   attest that this documentation has been prepared under the direction and in the presence of ARLEEN Brewer    Provider Attestation - Scribe documentation  All medical record entries made by the Scribe were at my direction and personally dictated by me. I have reviewed the chart and agree that the record accurately reflects my personal performance of the history, physical exam, discussion and plan.   ARLEEN Brewer        Electronically Signed By: ARLEEN Brewer   5/21/24  2:06 PM

## 2024-05-14 ENCOUNTER — NURSING HOME VISIT (OUTPATIENT)
Dept: POST ACUTE CARE | Facility: EXTERNAL LOCATION | Age: 84
End: 2024-05-14
Payer: MEDICARE

## 2024-05-14 DIAGNOSIS — I48.91 ATRIAL FIBRILLATION, UNSPECIFIED TYPE (MULTI): ICD-10-CM

## 2024-05-14 DIAGNOSIS — I10 HYPERTENSION, UNSPECIFIED TYPE: ICD-10-CM

## 2024-05-14 DIAGNOSIS — E11.40 TYPE 2 DIABETES MELLITUS WITH DIABETIC NEUROPATHY, UNSPECIFIED WHETHER LONG TERM INSULIN USE (MULTI): ICD-10-CM

## 2024-05-14 DIAGNOSIS — Z91.81 AT HIGH RISK FOR FALLS: ICD-10-CM

## 2024-05-14 DIAGNOSIS — R53.1 WEAKNESS: ICD-10-CM

## 2024-05-14 DIAGNOSIS — F03.90 DEMENTIA, UNSPECIFIED DEMENTIA SEVERITY, UNSPECIFIED DEMENTIA TYPE, UNSPECIFIED WHETHER BEHAVIORAL, PSYCHOTIC, OR MOOD DISTURBANCE OR ANXIETY (MULTI): ICD-10-CM

## 2024-05-14 PROBLEM — R82.90 CLOUDY URINE: Status: ACTIVE | Noted: 2024-05-14

## 2024-05-14 PROCEDURE — 99308 SBSQ NF CARE LOW MDM 20: CPT | Performed by: INTERNAL MEDICINE

## 2024-05-14 NOTE — LETTER
Patient: Carlito Rivas  : 1940    Encounter Date: 2024    PROGRESS NOTE    Subjective  Chief complaint: Carlito Rivas is a 83 y.o. male who is an acute skilled patient being seen and evaluated for weakness    HPI:  Patient seen and examined at bedside.  Patient denies n/v/f/c.  Continues working in therapy. Ambulating up to 100' with WW at [employer]. Nursing reports patient had fall with no apparent injuries. He refused an xray of his left ankle. Did obtain chest xray 1 view. His left ankle has no swelling, non tender with good ROM. No new complaints.      Objective  Vital signs: 132/67,70,97%,     Physical Exam  Constitutional:       General: He is not in acute distress.  Eyes:      Extraocular Movements: Extraocular movements intact.   Cardiovascular:      Rate and Rhythm: Normal rate and regular rhythm.   Pulmonary:      Effort: Pulmonary effort is normal.      Breath sounds: Normal breath sounds.   Abdominal:      General: Bowel sounds are normal.      Palpations: Abdomen is soft.   Genitourinary:     Comments: Esparza  Musculoskeletal:      Cervical back: Neck supple.      Right lower leg: No edema.      Left lower leg: No edema.   Neurological:      Mental Status: He is alert.      Motor: Weakness present.   Psychiatric:         Mood and Affect: Mood normal.         Behavior: Behavior is cooperative.         Assessment/Plan  Problem List Items Addressed This Visit       Hypertension     Monitor blood pressure  Lisinopril  Metoprolol         Dementia (Multi)     Assist with ADLs.  Monitor for changes  Speech therapy         Type 2 diabetes mellitus with diabetic neuropathy, unspecified (Multi)     Glucoscan  Sliding scale insulin  Monitor fasting blood sugar         Atrial fibrillation (Multi)     Monitor heart rate  Metoprolol  AC on hold         Weakness     Continuing therapy, work towards goals         At high risk for falls     Low bed with mattress at bedside            Medications, treatments,  and labs reviewed  Continue medications and treatments as listed in EMR    Scribe Attestation  I, Baltazar Williamsonibe   attest that this documentation has been prepared under the direction and in the presence of Nitin Hinds MD.     Provider Attestation - Scribe documentation  All medical record entries made by the Scribe were at my direction and personally dictated by me. I have reviewed the chart and agree that the record accurately reflects my personal performance of the history, physical exam, discussion and plan.   Nitin Hinds MD      Electronically Signed By: Nitin Hinds MD   5/14/24  3:32 PM

## 2024-05-14 NOTE — PROGRESS NOTES
PROGRESS NOTE    Subjective   Chief complaint: Carlito Rivas is a 83 y.o. male who is an acute skilled patient being seen and evaluated for weakness    HPI:  Patient seen and examined at bedside.  Patient denies n/v/f/c.  Continues working in therapy. Ambulating up to 100' with WW at [facility]. Nursing reports patient had fall with no apparent injuries. He refused an xray of his left ankle. Did obtain chest xray 1 view. His left ankle has no swelling, non tender with good ROM. No new complaints.      Objective   Vital signs: 132/67,70,97%,     Physical Exam  Constitutional:       General: He is not in acute distress.  Eyes:      Extraocular Movements: Extraocular movements intact.   Cardiovascular:      Rate and Rhythm: Normal rate and regular rhythm.   Pulmonary:      Effort: Pulmonary effort is normal.      Breath sounds: Normal breath sounds.   Abdominal:      General: Bowel sounds are normal.      Palpations: Abdomen is soft.   Genitourinary:     Comments: Esparza  Musculoskeletal:      Cervical back: Neck supple.      Right lower leg: No edema.      Left lower leg: No edema.   Neurological:      Mental Status: He is alert.      Motor: Weakness present.   Psychiatric:         Mood and Affect: Mood normal.         Behavior: Behavior is cooperative.         Assessment/Plan   Problem List Items Addressed This Visit       Hypertension     Monitor blood pressure  Lisinopril  Metoprolol         Dementia (Multi)     Assist with ADLs.  Monitor for changes  Speech therapy         Type 2 diabetes mellitus with diabetic neuropathy, unspecified (Multi)     Glucoscan  Sliding scale insulin  Monitor fasting blood sugar         Atrial fibrillation (Multi)     Monitor heart rate  Metoprolol  AC on hold         Weakness     Continuing therapy, work towards goals         At high risk for falls     Low bed with mattress at bedside            Medications, treatments, and labs reviewed  Continue medications and treatments as listed in  EMR    Scribe Attestation  I, Kristi Williamson   attest that this documentation has been prepared under the direction and in the presence of Nitin Hinds MD.     Provider Attestation - Scribe documentation  All medical record entries made by the Scribe were at my direction and personally dictated by me. I have reviewed the chart and agree that the record accurately reflects my personal performance of the history, physical exam, discussion and plan.   Nitin Hinds MD

## 2024-05-14 NOTE — PROGRESS NOTES
PROGRESS NOTE    Subjective   Chief complaint: Carlito Rivas is a 83 y.o. male who is an acute skilled patient being seen and evaluated for weakness    HPI:  HPI  Patient is continue to work in therapy.  Patient is working on gait training.  Patient is able to ambulate with a wheeled walker 100 feet requiring CGA.  Therapy is also working with patient on ADLs for upper and lower body bathing and dressing requiring mod assist to be completed.  Speech therapy working with patient to address cognition and dysphagia.  Patient seen and examined at the bedside, appears to be in no acute distress.    Objective   Vital signs: 133/79, blood sugar 147    Physical Exam  Constitutional:       General: He is not in acute distress.  Eyes:      Extraocular Movements: Extraocular movements intact.   Cardiovascular:      Rate and Rhythm: Normal rate and regular rhythm.   Pulmonary:      Effort: Pulmonary effort is normal.      Breath sounds: Normal breath sounds.   Genitourinary:     Comments: Esparza-urine cloudy yellow  Musculoskeletal:      Cervical back: Neck supple.      Right lower leg: No edema.      Left lower leg: No edema.   Neurological:      Mental Status: He is alert.      Motor: Weakness present.   Psychiatric:         Mood and Affect: Mood normal.         Behavior: Behavior is cooperative.         Assessment/Plan   Problem List Items Addressed This Visit       Atrial fibrillation (Multi)     Monitor heart rate  Metoprolol  AC on hold         Cloudy urine     Obtain BMP and CBC         Dementia (Multi)     Assist with ADLs.  Monitor for changes  Speech therapy         Hypertension     Monitor blood pressure  Lisinopril  Metoprolol  BP at goal           Type 2 diabetes mellitus with diabetic neuropathy, unspecified (Multi)     Glucoscan  Sliding scale insulin  Monitor fasting blood sugar         Weakness - Primary     Continuing therapy, work towards goals          Medications, treatments, and labs reviewed  Continue  medications and treatments as listed in EMR      Scribe Attestation  IDulce Scribe   attest that this documentation has been prepared under the direction and in the presence of ARLEEN Brewer    Provider Attestation - Scribe documentation  All medical record entries made by the Scribe were at my direction and personally dictated by me. I have reviewed the chart and agree that the record accurately reflects my personal performance of the history, physical exam, discussion and plan.   ARLEEN Brewer

## 2024-05-15 ENCOUNTER — NURSING HOME VISIT (OUTPATIENT)
Dept: POST ACUTE CARE | Facility: EXTERNAL LOCATION | Age: 84
End: 2024-05-15
Payer: MEDICARE

## 2024-05-15 DIAGNOSIS — R63.0 POOR APPETITE: ICD-10-CM

## 2024-05-15 DIAGNOSIS — I10 HYPERTENSION, UNSPECIFIED TYPE: ICD-10-CM

## 2024-05-15 DIAGNOSIS — R53.1 WEAKNESS: Primary | ICD-10-CM

## 2024-05-15 DIAGNOSIS — F03.90 DEMENTIA, UNSPECIFIED DEMENTIA SEVERITY, UNSPECIFIED DEMENTIA TYPE, UNSPECIFIED WHETHER BEHAVIORAL, PSYCHOTIC, OR MOOD DISTURBANCE OR ANXIETY (MULTI): ICD-10-CM

## 2024-05-15 DIAGNOSIS — E11.40 TYPE 2 DIABETES MELLITUS WITH DIABETIC NEUROPATHY, UNSPECIFIED WHETHER LONG TERM INSULIN USE (MULTI): ICD-10-CM

## 2024-05-15 PROCEDURE — 99309 SBSQ NF CARE MODERATE MDM 30: CPT | Performed by: NURSE PRACTITIONER

## 2024-05-15 NOTE — LETTER
Patient: Carlito Rivas  : 1940    Encounter Date: 05/15/2024    PROGRESS NOTE    Subjective  Chief complaint: Carlito Rivas is a 83 y.o. male who is an acute skilled patient being seen and evaluated for weakness    HPI:  HPI  Patient does continue to work towards goals in therapy.  Patient is working on gait training with a wheeled walker.  Patient is able to ambulate 100 feet requiring CGA and wheeled walker.  Patient is performing transfers from various surfaces requiring min assist.  Nursing called on  reported that patient refused left ankle x-ray, got chest x-ray 1 view status post fall.  Patient reportedly resisted to care.  Nursing called on  reported that patient is having a procedure and gave order to hold Xarelto for 72 hours prior to procedure.  Patient is not eating, losing weight.    Objective  Vital signs: 132/67, 70, 97% 97.1 blood sugar 113    Physical Exam  Constitutional:       General: He is not in acute distress.  Eyes:      Extraocular Movements: Extraocular movements intact.   Cardiovascular:      Rate and Rhythm: Normal rate and regular rhythm.   Pulmonary:      Effort: Pulmonary effort is normal.      Breath sounds: Normal breath sounds.   Abdominal:      General: Bowel sounds are normal.      Palpations: Abdomen is soft.   Musculoskeletal:      Cervical back: Neck supple.      Right lower leg: No edema.      Left lower leg: No edema.   Neurological:      Mental Status: He is alert.      Motor: Weakness present.   Psychiatric:         Mood and Affect: Mood normal.         Behavior: Behavior is cooperative.         Assessment/Plan  Problem List Items Addressed This Visit       Dementia (Multi)     Assist with ADLs.  Monitor for changes  Speech therapy  Psych consult         Hypertension     Blood pressure at goal  Monitor blood pressure  Lisinopril  Metoprolol         Poor appetite     Obtain CMP CBC TSH B12 and magnesium  Start Remeron         Type 2 diabetes mellitus with  diabetic neuropathy, unspecified (Multi)     Glucoscan  Sliding scale insulin  Fasting blood glucose at goal         Weakness - Primary     Continue therapy          Medications, treatments, and labs reviewed  Continue medications and treatments as listed in EMR      Scribe Attestation  IDulce Scribe   attest that this documentation has been prepared under the direction and in the presence of ARLEEN Brewer    Provider Attestation - Scribe documentation  All medical record entries made by the Scribe were at my direction and personally dictated by me. I have reviewed the chart and agree that the record accurately reflects my personal performance of the history, physical exam, discussion and plan.   ARLEEN Brewer        Electronically Signed By: ARLEEN Brewer   5/28/24 12:23 PM

## 2024-05-16 ENCOUNTER — NURSING HOME VISIT (OUTPATIENT)
Dept: POST ACUTE CARE | Facility: EXTERNAL LOCATION | Age: 84
End: 2024-05-16
Payer: MEDICARE

## 2024-05-16 DIAGNOSIS — R53.1 WEAKNESS: ICD-10-CM

## 2024-05-16 DIAGNOSIS — R63.0 POOR APPETITE: ICD-10-CM

## 2024-05-16 DIAGNOSIS — E11.40 TYPE 2 DIABETES MELLITUS WITH DIABETIC NEUROPATHY, UNSPECIFIED WHETHER LONG TERM INSULIN USE (MULTI): ICD-10-CM

## 2024-05-16 PROCEDURE — 99308 SBSQ NF CARE LOW MDM 20: CPT | Performed by: NURSE PRACTITIONER

## 2024-05-16 NOTE — LETTER
Patient: Carlito Rivas  : 1940    Encounter Date: 2024    PROGRESS NOTE    Subjective  Chief complaint: Carlito Rivas is a 83 y.o. male who is an acute skilled patient being seen and evaluated for weakness    HPI:  HPI  Therapy has been working with the patient to improve strength and endurance with ADLs, transfers, and mobility.  Patient continues to work toward goals.  Patient continues to have poor p.o. intake.  Remeron started 5/15.  Labs pending.    Objective  Vital signs:   Blood sugar 192    Physical Exam  Constitutional:       General: He is not in acute distress.  HENT:      Mouth/Throat:      Comments: Dry mucus membranes  Eyes:      Extraocular Movements: Extraocular movements intact.   Cardiovascular:      Rate and Rhythm: Normal rate and regular rhythm.   Pulmonary:      Effort: Pulmonary effort is normal.      Breath sounds: Normal breath sounds.   Musculoskeletal:      Cervical back: Neck supple.      Right lower leg: No edema.      Left lower leg: No edema.   Neurological:      Mental Status: He is alert.      Motor: Weakness present.   Psychiatric:         Mood and Affect: Mood normal.         Behavior: Behavior is cooperative.         Assessment/Plan  Problem List Items Addressed This Visit       Poor appetite     Remeron  Labs pending         Type 2 diabetes mellitus with diabetic neuropathy, unspecified (Multi)     FBG at goal  Glucoscan  Sliding scale insulin  Monitor fasting blood sugar         Weakness     Continuing therapy, work towards goals          Medications, treatments, and labs reviewed  Continue medications and treatments as listed in EMR    ARLEEN Brewer    Scribe Attestation  Heavenly AGUAYO   attest that this documentation has been prepared under the direction and in the presence of ARLEEN Brewer    Provider Attestation - Scribe documentation  All medical record entries made by the Scribe were at my direction and personally dictated  by me. I have reviewed the chart and agree that the record accurately reflects my personal performance of the history, physical exam, discussion and plan.      Electronically Signed By: ARLEEN Brewer   5/26/24  7:31 PM

## 2024-05-19 PROBLEM — R31.1 BENIGN ESSENTIAL MICROSCOPIC HEMATURIA: Status: ACTIVE | Noted: 2024-05-19
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
## 2024-05-19 NOTE — PROGRESS NOTES
PROGRESS NOTE   blood  Subjective   Chief complaint: Carlito Rivas is a 83 y.o. male who is an acute skilled patient being seen and evaluated for weakness    HPI:  HPI  Therapy has been working with the patient to improve strength and endurance with ADLs, transfers, and mobility.  Patient continues to work toward goals.  Nurse called 5/5 and reported that patient had a fall, pulled pickering out, and sustained a skin tear.  Patient was sen to the ed and returned.  No new concerns otherwise per nursing staff.      Objective:    Vital signs:   128/50, 97.8, 90 %17, blood sugar is 138    Physical Exam  Constitutional:       General: He is not in acute distress.  Eyes:      Extraocular Movements: Extraocular movements intact.   Cardiovascular:      Rate and Rhythm: Normal rate and regular rhythm.   Pulmonary:      Effort: Pulmonary effort is normal.      Breath sounds: Normal breath sounds.   Abdominal:      General: Bowel sounds are normal.      Palpations: Abdomen is soft.   Genitourinary:     Comments: Pickering - urine bloody  Musculoskeletal:      Cervical back: Neck supple.      Right lower leg: No edema.      Left lower leg: No edema.      Comments: Right knee pain and swelling, patient states was present prior to fall   Skin:     Comments: Dressing RFA clean dry and intact   Neurological:      Mental Status: He is alert.   Psychiatric:         Mood and Affect: Mood normal.         Behavior: Behavior is cooperative.         Assessment/Plan   Problem List Items Addressed This Visit       Fall       Right knee pain and swelling.  Patient states present prior to fall revising x-ray of right knee ,monitor,.           Hematuria - Primary     Secondary to trauma, irrigate catheter, hold anticoagulant to bleeding         Right knee pain     Patient refusing x-ray, will monitor         Type 2 diabetes mellitus with diabetic neuropathy, unspecified (Multi)     Glucoscan  Sliding scale insulin  Fasting blood glucose at goal           Medications, treatments, and labs reviewed  Continue medications and treatments as listed in EMR    ARLEEN Brewer    Scribe Attestation  IHeavenlyibdai   attest that this documentation has been prepared under the direction and in the presence of ARLEEN Brewer    Provider Attestation - Scribe documentation  All medical record entries made by the Scribe were at my direction and personally dictated by me. I have reviewed the chart and agree that the record accurately reflects my personal performance of the history, physical exam, discussion and plan.

## 2024-05-19 NOTE — ASSESSMENT & PLAN NOTE
Right knee pain and swelling.  Patient states present prior to fall revising x-ray of right knee ,monitor,.

## 2024-05-20 NOTE — PROGRESS NOTES
PROGRESS NOTE    Subjective   Chief complaint: Carlito Rivas is a 83 y.o. male who is an acute skilled patient being seen and evaluated for weakness    HPI:  HPI  Patient does continue to work in therapy.  Patient is working on gait training with wheeled walker.  Patient is able to ambulate 100 feet with wheeled walker and CGA.  Patient is working on ADLs, upper and lower body requiring mod assist to be completed.  Speech therapy is working with patient to address cognition and dysphagia.  Patient seen and examined at the bedside, appears to be in no acute distress.    Objective   Vital signs: 133/79, blood sugar 160    Physical Exam  Constitutional:       General: He is not in acute distress.  Eyes:      Extraocular Movements: Extraocular movements intact.   Cardiovascular:      Rate and Rhythm: Normal rate and regular rhythm.   Pulmonary:      Effort: Pulmonary effort is normal.      Breath sounds: Normal breath sounds.   Musculoskeletal:      Cervical back: Neck supple.      Right lower leg: No edema.      Left lower leg: No edema.   Neurological:      Mental Status: He is alert.      Motor: Weakness present.         Assessment/Plan   Problem List Items Addressed This Visit       Atrial fibrillation (Multi)     Monitor heart rate  Metoprolol  AC on hold         Dementia (Multi)     Assist with ADLs.  Monitor for changes  Speech therapy         Hypertension     BP at goal  Monitor blood pressure  Lisinopril  Metoprolol         Type 2 diabetes mellitus with diabetic neuropathy, unspecified (Multi)     Glucoscan  Sliding scale insulin  Monitor fasting blood sugar         Weakness - Primary     Continue to work towards goals in therapy          Medications, treatments, and labs reviewed  Continue medications and treatments as listed in EMR      Scribe Attestation  I, Kristi Waite   attest that this documentation has been prepared under the direction and in the presence of Ira Villegas,  APRN-CNP    Provider Attestation - Scribe documentation  All medical record entries made by the Scribe were at my direction and personally dictated by me. I have reviewed the chart and agree that the record accurately reflects my personal performance of the history, physical exam, discussion and plan.   Ira Villegas, ALVARO-CNP

## 2024-05-21 PROBLEM — R52 PAIN: Status: ACTIVE | Noted: 2024-05-21

## 2024-05-21 PROBLEM — R63.0 POOR APPETITE: Status: ACTIVE | Noted: 2024-05-21

## 2024-05-21 NOTE — PROGRESS NOTES
PROGRESS NOTE    Subjective   Chief complaint: Carlito Rivas is a 83 y.o. male who is an acute skilled patient being seen and evaluated for weakness    HPI:  HPI  Patient does continue to work in therapy.  Patient is working on gait training with wheeled walker and CGA.  Patient is able to ambulate 100 feet.  Patient is also working on ADLs and self-care for upper and lower body requiring mod assist.  Speech therapy is working with patient to address cognition and dysphagia.  Patient seen and examined at the bedside, appears to be in no acute distress.  Nurse reporting patient had a fall    Objective   Vital signs: 132/76, 72, 99% 97.1, 17, blood sugar 154    Physical Exam  Constitutional:       General: He is not in acute distress.  Eyes:      Extraocular Movements: Extraocular movements intact.   Cardiovascular:      Rate and Rhythm: Normal rate and regular rhythm.   Pulmonary:      Effort: Pulmonary effort is normal.      Breath sounds: Normal breath sounds.   Abdominal:      General: Bowel sounds are normal.      Palpations: Abdomen is soft.   Genitourinary:     Comments: Esparza-urine clear yellow  Musculoskeletal:      Cervical back: Neck supple.      Right lower leg: No edema.      Left lower leg: No edema.      Comments: Left ankle and right ribs tender to palpation   Neurological:      Mental Status: He is alert.      Motor: Weakness present.   Psychiatric:         Mood and Affect: Mood normal.         Behavior: Behavior is cooperative.         Assessment/Plan   Problem List Items Addressed This Visit       Hypertension     Monitor blood pressure  Lisinopril  Metoprolol         Dementia (Multi)     Assist with ADLs.  Monitor for changes  Speech therapy         Type 2 diabetes mellitus with diabetic neuropathy, unspecified (Multi)     Glucoscan  Sliding scale insulin  Monitor fasting blood sugar         Weakness - Primary     Continue therapy         Hematuria     Resolved, resume anticoagulant         Pain      Left ankle and rib pain: Obtain x-ray          Medications, treatments, and labs reviewed  Continue medications and treatments as listed in EMR      Scribe Attestation  IDulce Scribe   attest that this documentation has been prepared under the direction and in the presence of ARLEEN Brewer    Provider Attestation - Scribe documentation  All medical record entries made by the Scribe were at my direction and personally dictated by me. I have reviewed the chart and agree that the record accurately reflects my personal performance of the history, physical exam, discussion and plan.   ARLEEN Brewer

## 2024-05-22 NOTE — PROGRESS NOTES
PROGRESS NOTE    Subjective   Chief complaint: Carlito Rivas is a 83 y.o. male who is an acute skilled patient being seen and evaluated for weakness    HPI:  HPI  Therapy has been working with the patient to improve strength and endurance with ADLs, transfers, and mobility.  Patient continues to work toward goals.  Patient continues to have poor p.o. intake.  Remeron started 5/15.  Labs pending.    Objective   Vital signs:   Blood sugar 192    Physical Exam  Constitutional:       General: He is not in acute distress.  HENT:      Mouth/Throat:      Comments: Dry mucus membranes  Eyes:      Extraocular Movements: Extraocular movements intact.   Cardiovascular:      Rate and Rhythm: Normal rate and regular rhythm.   Pulmonary:      Effort: Pulmonary effort is normal.      Breath sounds: Normal breath sounds.   Musculoskeletal:      Cervical back: Neck supple.      Right lower leg: No edema.      Left lower leg: No edema.   Neurological:      Mental Status: He is alert.      Motor: Weakness present.   Psychiatric:         Mood and Affect: Mood normal.         Behavior: Behavior is cooperative.         Assessment/Plan   Problem List Items Addressed This Visit       Poor appetite     Remeron  Labs pending         Type 2 diabetes mellitus with diabetic neuropathy, unspecified (Multi)     FBG at goal  Glucoscan  Sliding scale insulin  Monitor fasting blood sugar         Weakness     Continuing therapy, work towards goals          Medications, treatments, and labs reviewed  Continue medications and treatments as listed in EMR    ARLEEN Brewer    Scribe Attestation  Heavenly AGUAYOibdai   attest that this documentation has been prepared under the direction and in the presence of ARLEEN Brewer    Provider Attestation - Scribe documentation  All medical record entries made by the Scribe were at my direction and personally dictated by me. I have reviewed the chart and agree that the record  accurately reflects my personal performance of the history, physical exam, discussion and plan.

## 2024-05-28 PROBLEM — R31.1 BENIGN ESSENTIAL MICROSCOPIC HEMATURIA: Status: RESOLVED | Noted: 2024-05-19 | Resolved: 2024-05-28

## 2024-05-28 PROBLEM — M25.561 RIGHT KNEE PAIN: Status: ACTIVE | Noted: 2024-05-28

## 2024-05-28 NOTE — PROGRESS NOTES
PROGRESS NOTE    Subjective   Chief complaint: Carlito Rivas is a 83 y.o. male who is an acute skilled patient being seen and evaluated for weakness    HPI:  HPI  Patient does continue to work towards goals in therapy.  Patient is working on gait training with a wheeled walker.  Patient is able to ambulate 100 feet requiring CGA and wheeled walker.  Patient is performing transfers from various surfaces requiring min assist.  Nursing called on 5\13 reported that patient refused left ankle x-ray, got chest x-ray 1 view status post fall.  Patient reportedly resisted to care.  Nursing called on 5\14 reported that patient is having a procedure and gave order to hold Xarelto for 72 hours prior to procedure.  Patient is not eating, losing weight.    Objective   Vital signs: 132/67, 70, 97% 97.1 blood sugar 113    Physical Exam  Constitutional:       General: He is not in acute distress.  Eyes:      Extraocular Movements: Extraocular movements intact.   Cardiovascular:      Rate and Rhythm: Normal rate and regular rhythm.   Pulmonary:      Effort: Pulmonary effort is normal.      Breath sounds: Normal breath sounds.   Abdominal:      General: Bowel sounds are normal.      Palpations: Abdomen is soft.   Musculoskeletal:      Cervical back: Neck supple.      Right lower leg: No edema.      Left lower leg: No edema.   Neurological:      Mental Status: He is alert.      Motor: Weakness present.   Psychiatric:         Mood and Affect: Mood normal.         Behavior: Behavior is cooperative.         Assessment/Plan   Problem List Items Addressed This Visit       Dementia (Multi)     Assist with ADLs.  Monitor for changes  Speech therapy  Psych consult         Hypertension     Blood pressure at goal  Monitor blood pressure  Lisinopril  Metoprolol         Poor appetite     Obtain CMP CBC TSH B12 and magnesium  Start Remeron         Type 2 diabetes mellitus with diabetic neuropathy, unspecified (Multi)     Glucoscan  Sliding scale  insulin  Fasting blood glucose at goal         Weakness - Primary     Continue therapy          Medications, treatments, and labs reviewed  Continue medications and treatments as listed in EMR      Scribe Attestation  IDulce Scribe   attest that this documentation has been prepared under the direction and in the presence of ARLEEN Brewer    Provider Attestation - Scribe documentation  All medical record entries made by the Scribe were at my direction and personally dictated by me. I have reviewed the chart and agree that the record accurately reflects my personal performance of the history, physical exam, discussion and plan.   ARLEEN Brewer

## 2024-11-07 ENCOUNTER — APPOINTMENT (OUTPATIENT)
Dept: CARDIOLOGY | Facility: CLINIC | Age: 84
End: 2024-11-07
Payer: OTHER GOVERNMENT